# Patient Record
Sex: MALE | Race: WHITE | NOT HISPANIC OR LATINO | Employment: OTHER | URBAN - METROPOLITAN AREA
[De-identification: names, ages, dates, MRNs, and addresses within clinical notes are randomized per-mention and may not be internally consistent; named-entity substitution may affect disease eponyms.]

---

## 2017-02-15 ENCOUNTER — OFFICE VISIT (OUTPATIENT)
Dept: FAMILY MEDICINE | Facility: CLINIC | Age: 47
End: 2017-02-15
Payer: COMMERCIAL

## 2017-02-15 VITALS
WEIGHT: 175.38 LBS | SYSTOLIC BLOOD PRESSURE: 138 MMHG | HEIGHT: 69 IN | DIASTOLIC BLOOD PRESSURE: 92 MMHG | BODY MASS INDEX: 25.98 KG/M2 | TEMPERATURE: 98 F | HEART RATE: 79 BPM | RESPIRATION RATE: 20 BRPM

## 2017-02-15 DIAGNOSIS — K21.00 GASTROESOPHAGEAL REFLUX DISEASE WITH ESOPHAGITIS: Primary | ICD-10-CM

## 2017-02-15 DIAGNOSIS — I10 ESSENTIAL HYPERTENSION: ICD-10-CM

## 2017-02-15 PROCEDURE — 99999 PR PBB SHADOW E&M-EST. PATIENT-LVL III: CPT | Mod: PBBFAC,,, | Performed by: FAMILY MEDICINE

## 2017-02-15 PROCEDURE — 3080F DIAST BP >= 90 MM HG: CPT | Mod: S$GLB,,, | Performed by: FAMILY MEDICINE

## 2017-02-15 PROCEDURE — 90715 TDAP VACCINE 7 YRS/> IM: CPT | Mod: S$GLB,,, | Performed by: FAMILY MEDICINE

## 2017-02-15 PROCEDURE — 99213 OFFICE O/P EST LOW 20 MIN: CPT | Mod: 25,S$GLB,, | Performed by: FAMILY MEDICINE

## 2017-02-15 PROCEDURE — 90471 IMMUNIZATION ADMIN: CPT | Mod: S$GLB,,, | Performed by: FAMILY MEDICINE

## 2017-02-15 PROCEDURE — 3075F SYST BP GE 130 - 139MM HG: CPT | Mod: S$GLB,,, | Performed by: FAMILY MEDICINE

## 2017-02-15 RX ORDER — DEXLANSOPRAZOLE 60 MG/1
60 CAPSULE, DELAYED RELEASE ORAL DAILY
Qty: 30 CAPSULE | Refills: 11 | Status: SHIPPED | OUTPATIENT
Start: 2017-02-15 | End: 2017-04-03

## 2017-02-15 RX ORDER — BENAZEPRIL HYDROCHLORIDE 10 MG/1
10 TABLET ORAL DAILY
Qty: 30 TABLET | Refills: 11 | Status: SHIPPED | OUTPATIENT
Start: 2017-02-15 | End: 2017-09-11 | Stop reason: SDUPTHER

## 2017-02-15 NOTE — MR AVS SNAPSHOT
[facility]  [address] [facility] [address]  Phone: [phone]  Fax: [phone]                  Nj Ovalle Jr.   2/15/2017 11:40 AM   Office Visit    Description:  Male : 1970   Provider:  Gomez Ta MD   Department:  [facility]           Reason for Visit     Other Misc           Diagnoses this Visit        Comments    Gastroesophageal reflux disease with esophagitis    -  Primary     Essential hypertension                To Do List           Goals (5 Years of Data)     None      Follow-Up and Disposition     Return in about 3 weeks (around 3/8/2017) for blood pressure check.       These Medications        Disp Refills Start End    dexlansoprazole (DEXILANT) 60 mg capsule 30 capsule 11 2/15/2017 2/15/2018    Take 1 capsule (60 mg total) by mouth once daily. - Oral    Pharmacy: [facility] - [place] [id number] [facility] Ph #: [phone]       benazepril (LOTENSIN) 10 MG tablet 30 tablet 11 2/15/2017     Take 1 tablet (10 mg total) by mouth once daily. - Oral    Pharmacy: [facility] - [place] [id number] [facility] Ph #: [phone]         [facility]     [facility] -  Assistance  Registered nurses in the [facility] provide clinical advisement, health education, appointment booking, and other advisory services.  Call for this free service at [phone].             Medications           Message regarding Medications     Verify the changes and/or additions to your medication regime listed below are the same as discussed with your clinician today.  If any of these changes or additions are incorrect, please notify your healthcare provider.        START taking these NEW medications        Refills    dexlansoprazole (DEXILANT) 60 mg capsule 11    Sig: Take 1 capsule (60 mg total) by mouth once daily.    Class: Normal    Route: Oral    benazepril (LOTENSIN) 10 MG tablet  "11    Sig: Take 1 tablet (10 mg total) by mouth once daily.    Class: Normal    Route: Oral      STOP taking these medications     esomeprazole (NEXIUM) 40 MG capsule Take 1 capsule (40 mg total) by mouth before breakfast.           Verify that the below list of medications is an accurate representation of the medications you are currently taking.  If none reported, the list may be blank. If incorrect, please contact your healthcare provider. Carry this list with you in case of emergency.           Current Medications     escitalopram oxalate (LEXAPRO) 20 MG tablet Take 1 tablet (20 mg total) by mouth once daily.    benazepril (LOTENSIN) 10 MG tablet Take 1 tablet (10 mg total) by mouth once daily.    dexlansoprazole (DEXILANT) 60 mg capsule Take 1 capsule (60 mg total) by mouth once daily.           Clinical Reference Information           Your Vitals Were     BP Pulse Temp Resp Height Weight    138/92 79 98.2 °F (36.8 °C) (Oral) 20 5' 9" (1.753 m) 79.5 kg (175 lb 6 oz)    BMI                25.9 kg/m2          Blood Pressure          Most Recent Value    BP  (!)  138/92      Allergies as of 2/15/2017     Bupropion Hcl    Benadryl [Diphenhydramine Hcl]      Immunizations Administered on Date of Encounter - 2/15/2017     Name Date Dose VIS Date Route    TDAP  Incomplete 0.5 mL 2/24/2015 Intramuscular      Orders Placed During Today's Visit      Normal Orders This Visit    Tdap Vaccine       Instructions    Controlling High Blood Pressure  High blood pressure (hypertension) is called the silent killer. This is because many people who have it dont know it. Normal blood pressure is less than 120/80. Know your blood pressure and remember to check it regularly. Doing so can save your life. Here are some things you can do to help control your blood pressure.    Choose heart-healthy foods  · Select low-salt, low-fat foods.  · Limit canned, dried, cured, packaged, and fast foods. These can contain a lot of salt.  · Eat " 8-10 servings of  fruits and vegetables every day.  · Choose lean meats, fish, or chicken.  · Eat whole-grain pasta, brown rice, and beans.  · Eat 2-3 servings of low-fat or fat-free dairy products  · Ask your doctor about the DASH eating plan. This plan helps reduce blood pressure.  Maintain a healthy weight  · Ask your healthcare provider how many calories to eat a day. Then stick to that number.  · Ask your healthcare provider what weight range is healthiest for you. If you are overweight, weight loss of only 10 lbs can help lower blood pressure.  · Limit snacks and sweets.  · Get regular exercise.    Get up and get active  · Choose activities you enjoy. Find ones you can do with friends or family.  · Park farther away from building entrances.  · Use stairs instead of the elevator.  · When you can, walk or bike instead of driving.  · [place] leaves, garden, or do household repairs.  · Be active for at least 30 minutes a day, most days of the week.  Manage stress  · Make time to relax and enjoy life. Find time to laugh.  · Visit with family and friends, and keep up with hobbies.  Limit alcohol and quit smoking  · Men: Have no more than 2 drinks per day.  · Women: Have no more than 1 drink per day.  · Talk with your healthcare provider about quitting smoking. Smoking increases your risk for heart disease and stroke. Ask about local or community programs that can help.  Medications   If lifestyle changes arent enough, your healthcare provider may prescribe high blood pressure medicine. Take all medications as prescribed.    © 3053-4160 [facility], [address] [place] [address], [place] [address]. All rights reserved. This information is not intended as a substitute for professional medical care. Always follow your healthcare professional's instructions.  Discharge Instructions for High Blood Pressure (Hypertension)  You have been diagnosed with hypertension. Also called high blood pressure, this means the force of blood  against your artery walls is too strong. It also means your heart is working hard to move blood. High blood pressure produces no symptoms, but over time it can damage your heart, blood vessels, eyes, kidneys, and other organs. With help from your doctor, you can manage your blood pressure and protect your health.  Taking Medications  MARGIN-BOTTOM: 0mm   Learn to take your own blood pressure. Keep a record of your results. Ask your doctor which readings mean that you need medical attention.  Take your blood pressure medication exactly as directed. Dont skip doses. Missing doses can cause your blood pressure to get out of control.  Avoid medications that contain heart stimulants, including over-the-counter drugs. Check for warnings about hypertension on the label.  Check with your doctor before taking a decongestant. Some decongestants can worsen hypertension.  Lifestyle Changes  MARGIN-BOTTOM: 0mm   Maintain a healthy weight. Get help to lose any extra pounds.  Cut back on salt.   · Limit canned, dried, packaged, and fast foods.   · Dont add salt to your food at the table.   · Season foods with herbs instead of salt when you cook.   Begin an exercise program. Ask your doctor how to get started. You can benefit from simple activities like walking or gardening.  Break the smoking habit. Enroll in a stop-smoking program to improve your chances of success. Ask your healthcare provider about programs and medications to help you stop smoking.  Limit drinks that contain caffeine (coffee, black or green tea, cola) to 2 per day.  Never take stimulants such as amphetamines or cocaine; these drugs can be deadly for someone with hypertension.  Control your stress. Learn stress-management techniques.  Limit alcohol to no more than 2 drinks a day.  Follow-Up  Make a follow-up appointment as directed by our staff.  When to Call Your Doctor   Call your doctor immediately if you have any of the following:  MARGIN-BOTTOM: 0mm    Chest pain or shortness of breath (call 911)  Moderate to severe headache  Weakness in the muscles of your face, arms, or legs  Trouble speaking  Extreme drowsiness  Confusion  Fainting or dizziness  Pulsating or rushing sound in your ears  Unexplained nosebleed  Weakness, tingling, or numbness of your face, arms, or legs  Change in vision    © 2000-2011 [employer], [address] [place] [address], [place] [address]. All rights reserved. This information is not intended as a substitute for professional medical care. Always follow your healthcare professional's instructions.  Discharge Instructions: Taking Your Blood Pressure  Blood pressure is the force of blood as it moves from the heart through the blood vessels. You can take your own blood pressure reading using a digital monitor. Take readings as often as your doctor instructs. Take your readings in the same way, using the same arm. Here are guidelines for taking your blood pressure.   1. Relax   · Wait at least a half-hour after smoking, eating, or exercising.  · Sit comfortably at a table. Place the monitor near you.  · Rest for a few minutes before you begin.      2. Wrap the Cuff   · Place your arm on the table, palm up. Put your arm in a position that is level with your heart. Wrap the cuff around your upper arm, just above your elbow. It's best to wrap the cuff on bare skin, not over clothing.  · Make sure your cuff fits. If it doesn't wrap around your upper arm, order a larger cuff.      3. Inflate the Cuff   · Pump the cuff until the scale reads 160. If you have a self-inflating cuff, push the button that starts the pump.  · The cuff will tighten, then loosen.  · The numbers will change. When they stop changing, your blood pressure reading will appear.  · If you get a reading that is too high or too low for you, relax for a few minutes. Then do the test again.      4. Write Down the Results   · Write down your blood pressure numbers. Aba the date and  "time. Keep your results in one place, such as a notebook.  · Remove the cuff from your arm. Turn off the machine.         © 1132-9937 [employer], [address] [place] [address], [place] [address]. All rights reserved. This information is not intended as a substitute for professional medical care. Always follow your healthcare professional's instructions.  HIGH BLOOD PRESSURE --ESTABLISHED    High Blood Pressure (Hypertension) is a chronic disease. The cause is unknown in most cases. It can usually be controlled with lifestyle changes and/or medicines. Symptoms of high blood pressure may include headache, dizziness, visual changes, chest pain and shortness of breath. Sometimes it causes no symptoms at all. However, even if there are no symptoms, untreated high blood pressure increases the risk of heart attack and stroke. It is a serious health risk and should not be ignored.  A normal blood pressure is 120/80 or less. The first (top) number is the "systolic" pressure. The second (bottom) number is the "diastolic" pressure. Hypertension exists when either the top number is 140 or higher, OR the bottom number is 90 or higher on repeated measurements.  HOME CARE:  All patients with high blood pressure should do the following to lower their pressure. If you are on medicines, then these methods may reduce or eliminate your need for medicines in the future.  MARGIN-BOTTOM: 0mm   Begin a weight loss program if you are overweight.  Reduce your salt intake.  · Avoid high salt foods (olives, pickles, smoked meats, salted potato chips, etc.).   · Do not add salt to your food at the table.   · Use only small amounts of salt when cooking.   MARGIN-BOTTOM: 0mm   Begin an exercise program. Discuss with your doctor what type of exercise program would be best for you. It doesn't have to be difficult. Even brisk walking for 20 minutes three times a week is a good form of exercise.  Avoid medicines which contain heart stimulants. This " includes many cold and sinus decongestant pills and sprays as well as diet pills. Check the warnings about hypertension on the label. Stimulants such as amphetamine or cocaine could be lethal for someone with hypertension. Never take these.  Limit your caffeine intake or switch to caffeine-free products.  Stop smoking. If you are a long-time smoker, this can be hard. Enroll in a stop-smoking program to improve your chance of success.  Learning how to handle stress better is an important part of any program to lower blood pressure. Learn about relaxation methods such as meditation, yoga or biofeedback.  If medicines were prescribed, take them exactly as directed. Missing doses may cause your blood pressure get out of control.  Consider buying an automatic blood pressure machine (available at most pharmacies). Use this to monitor your blood pressure at home and report the results to your doctor.  FOLLOW UP: Regular visits to your own physician for blood pressure checks and medicine adjustment is an important part of your care. Make a follow-up appointment as directed by our staff.  GET PROMPT MEDICAL ATTENTION if any of the following occur:  MARGIN-BOTTOM: 0mm   Chest pain or shortness of breath  Severe headache  Throbbing or rushing sound in the ears  Nosebleed  Sudden severe abdominal pain  Extreme drowsiness, confusion or fainting  Dizziness or vertigo (dizziness with spinning sensation)  Weakness of an arm or leg or one side of the face  Difficulty with speech or vision  © 6555-9779 [facility], [address] [place] [address], [place] [address]. All rights reserved. This information is not intended as a substitute for professional medical care. Always follow your healthcare professional's instructions.  HIGH BLOOD PRESSURE -- NEW (begin tx)  Your blood pressure was high enough today to start treatment with medicines. The cause of hypertension is unknown in most cases, but can be controlled with lifestyle changes  "and/or medicines. Hypertension may cause headache, dizziness, blurred vision, rushing sound in your ears, chest pain or shortness of breath. Sometimes it causes no symptoms at all. However, untreated hypertension increases the risk of heart attack and stroke. It is a serious health risk and should not be ignored.    A normal blood pressure is 120/80 or less. The first (top) number is the "systolic" pressure. The second (bottom) number is the "diastolic" pressure. Hypertension exists when either the top number is 140 or higher, OR the bottom number is 90 or higher on repeated measurements.  HOME CARE:  All patients with hypertension should do the following to lower their pressure. If you are on medicines, then these methods may reduce or eliminate your need for medicine in the future.  MARGIN-BOTTOM: 0mm   Begin a weight loss program if you are overweight.  Reduce your salt intake.  · Avoid high salt foods (olives, pickles, smoked meats, salted potato chips, etc.).   · Do not add salt to your food at the table.   · Use only small amounts of salt when cooking.   MARGIN-BOTTOM: 0mm   Begin an exercise program. Discuss with your doctor what type of exercise program would be best for you. It doesn't have to be difficult. Even brisk walking for 20 minutes three times a week is a good form of exercise.  Avoid medicines which contain heart stimulants. This includes many cold and sinus decongestant pills and sprays as well as diet pills. Check the warnings about hypertension on the label. Stimulants such as amphetamine or cocaine could be lethal for someone with hypertension. Never take these.  Limit your caffeine intake or switch to caffeine-free products.  Stop smoking. If you are a long-time smoker, this can be hard. Enroll in a stop-smoking program to improve your chance of success. Talk to your physician about ways to improve your chance of success.  Learning how to handle stress better is an important part of any " program to lower blood pressure. Learn about relaxation methods such as meditation, yoga, or biofeedback.  If medicines were prescribed, take them exactly as directed. Missing doses may cause your blood pressure to get out of control.  Consider buying an automatic blood pressure machine (available at many pharmacies). Use this to monitor your blood pressure and report to your doctor.  FOLLOW UP: Because a new blood pressure medicine was started today, it is important that you have your blood pressure rechecked to be sure you are responding well and that there are no serious side effects. Unless told otherwise, follow-up with your doctor or this facility within the next THREE DAYS.  GET PROMPT MEDICAL ATTENTION if any of the following occur:  MARGIN-BOTTOM: 0mm   Chest pain or shortness of breath  Severe headache  Throbbing or rushing sound in the ears  Nosebleed  Sudden severe abdominal pain  Extreme drowsiness, confusion or fainting  Dizziness or vertigo (dizziness with spinning sensation)  Weakness of an arm or leg or one side of the face  Difficulty with speech or vision  © 7152-3657 [place] [address]. All rights reserved. This information is not intended as a substitute for professional medical care. Always follow your healthcare professional's instructions.  Tips for Using Less Salt  Most people with heart problems need to eat less salt (sodium). Reducing the amount of salt you eat may help control your blood pressure. The higher your blood pressure, the greater your risk for heart disease, stroke, blindness, and kidney problems.    At the Store  · Make low-salt choices by reading labels carefully. Look for the total amount of sodium per serving.  · Use more fresh food. Buy more fruits and vegetables. Select lean meats, fish, and poultry.  · Use less frozen, canned, and packaged foods. These often contain a lot of sodium.    In the Kitchen  · Dont add salt to food when  youre cooking. Season with flavorings such as onion, garlic, pepper, and lemon.  · Use a cookbook containing low-salt recipes. It can give you ideas for tasty meals that are healthy for your heart.  · Sprinkle salt-free herbal blends on vegetables and meat.    Eating Out  · Tell the  youre on a low-salt diet. Ask questions about the menu.  · Order fish, chicken, and meat broiled, baked, poached, or grilled without salt, butter, or breading.  · Use lemon, pepper, and salt-free herb mixes to add flavor.  · Choose plain steamed rice, boiled noodles, and baked or boiled potatoes. Top potatoes with chives and a little sour cream.  Beware! Salt goes by many other names. Limit foods with these words listed as ingredients: salt, sodium, soy sauce, baking soda, baking powder, MSG, monosodium, Na (the chemical symbol for sodium). Some antacids are also high in salt.    © 4987-5337 [facility], [address] [place] [address]. All rights reserved. This information is not intended as a substitute for professional medical care. Always follow your healthcare professional's instructions.  High Blood Pressure:  Low-Sodium Diet   Low-sodium diets are often prescribed to help control high blood pressure.  The most common source of sodium in the diet is table salt added to foods.  A low-sodium diet limits the amount of sodium in your diet to no more than 2 grams (2000 milligrams) a day.  This is about half the amount of sodium in the average diet.    Dietary Recommendations   You can reduce the amount of sodium in your diet by following these guidelines:    Read labels carefully.  Look for any form of sodium or salt, such as sodium benzoate or sodium citrate.     Add very little or no salt to food that you prepare.     Check the sodium content when you use baking powder, baking soda, and monosodium glutamate (MSG).     Do not add salt to food at the table.     Fast foods are very high in salt, as are many  other restaurant foods.  When you eat at a restaurant, try steamed fish and vegetables or fresh salads.  Avoid soups.     Avoid eating the following foods:    ketchup, prepared mustard, pickles, and olives    soy sauce, steak or barbecue sauce, chili sauce, or Worcestershire sauce    bouillon cubes    commercially prepared or cured meats or fish (for example, dean, luncheon meats, and canned sardines)    canned vegetables, soups, and other packaged convenience foods    salty cheeses and buttermilk    salted nuts and peanut butter    self-rising flour and biscuit mixes    salted crackers, chips, popcorn, and pretzels    commercial salad dressings    instant cooked cereals.    Many of these foods are now available in unsalted or low-sodium versions.  Read all labels carefully.    If your diet must be restricted to much lower amounts of sodium, talk to your doctor and a registered dietitian for help in planning your meals.  It is important to keep your meals nutritionally balanced and tasty.  It can be hard to follow a restricted-salt diet if the food doesn't taste good.    Use of Salt Substitutes   Ask your doctor if you should use salt substitutes.  Most salt substitutes contain potassium for flavor and potassium may not be good for you.    Substitutions and Hints    Cook with imagination and make food attractive.     Season foods with herbs and spices.  Use onions, garlic, parsley, lemon and lime juice and rind, dill weed, basil, tarragon, marjoram, thyme, dawn powder, turmeric, cumin, paprika, vinegar, and wine to enhance the flavor and aroma of foods.  Mushrooms, celery, red pepper, yellow pepper, green pepper, and home-dried fruits also enhance specific dishes.     Eat fresh fruits, vegetables, and meats as much as possible.  Plain frozen fruits and vegetables usually do not have added salt.     Add a pinch of sugar or a squeeze of lemon juice to bring out the flavor in fresh vegetables.     If  you must use canned products, use the low-sodium types (except for fruit).  Rinse canned vegetables with tap water before cooking.     Substitute unsalted, polyunsaturated margarine for regular margarine or butter.     Eat low-sodium cheeses.  Many are available now, some with herbs and spices that are very tasty, and many are also low-fat.     Drink low-sodium juices.     Make unsalted or lightly salted soup stocks and keep them in the freezer to use as substitutes for canned broth and bouillon.  Use these broths to enhance vegetables.     Substitute amy and vinegars (especially the flavored vinegars) for salt to enhance flavors.     Eat tuna and salmon packed in water instead of oil, and rinse first with running water.     Use one or more of the following to season chicken: dawn, turmeric, cumin, cilantro, tarragon, thyme, [place], onions, garlic, mushrooms, tomatoes, or orange, lemon, or lime juice with jessy.     Use one or more of the following to season beef:  dry mustard, marjoram, thyme, bay leaf, pepper, red wine, mushrooms, onions, red or green pepper, parsley, dawn, green chilies, or orange rind.     Use one or more of the following to season seafood: lemon, parsley, paprika, wine, garlic and onions, cilantro, jessy, bay leaf, fennel, dill, marjoram, or thyme.     Use one or more of the following to season pasta:  basil, oregano, fresh tomatoes, onions, garlic, green pepper, red pepper, yellow pepper, low-salt salad dressings, pine nuts, or low-salt mozzarella cheese.     Cook rice in homemade broth with mushrooms and scallions or shallots.     Use herbs and spices carefully at first.  Whenever possible, use fresh or dried herbs.    Help Yourself Become Healthier    Try some of the many frozen prepared meals that meet the American Heart Association guidelines for sodium and fat content.     Read food labels for sodium and fat content.     Read nutrition information available at your  local library, from the American Heart Association, and through nutrition programs and health fairs.     Contact a dietitian for information.     Look for some of the excellent low-sodium cookbooks available in most bookstores.     Take time to plan your meals.  You will be pleasantly surprised at how fast you learn new food preparations, how lowering your sodium intake lowers your blood pressure, and how good food can be.    Developed by Millie Chance R.N., M.N., and [employer]         Smoking Cessation     If you would like to quit smoking:   You may be eligible for free services if you are a [place] resident and started smoking cigarettes before September 1, 1988.  Call the Smoking Cessation [employer] ([facility]) toll free at [phone] or [phone].   Call -800-QUIT-NOW if you do not meet the above criteria.            Language Assistance Services     ATTENTION: Language assistance services are available, free of charge. Please call [phone].      ATENCIÓN: Si habla español, tiene a hirsch disposición servicios gratuitos de asistencia lingüística. Llame al [phone].     CHÚ Ý: N?u b?n nói Ti?ng Vi?t, có các d?ch v? h? tr? ngôn ng? mi?n phí dành cho b?n. G?i s? [phone].         [facility] complies with applicable Federal civil rights laws and does not discriminate on the basis of race, color, national origin, age, disability, or sex.

## 2017-02-15 NOTE — PATIENT INSTRUCTIONS
Controlling High Blood Pressure  High blood pressure (hypertension) is called the silent killer. This is because many people who have it dont know it. Normal blood pressure is less than 120/80. Know your blood pressure and remember to check it regularly. Doing so can save your life. Here are some things you can do to help control your blood pressure.    Choose heart-healthy foods  · Select low-salt, low-fat foods.  · Limit canned, dried, cured, packaged, and fast foods. These can contain a lot of salt.  · Eat 8-10 servings of  fruits and vegetables every day.  · Choose lean meats, fish, or chicken.  · Eat whole-grain pasta, brown rice, and beans.  · Eat 2-3 servings of low-fat or fat-free dairy products  · Ask your doctor about the DASH eating plan. This plan helps reduce blood pressure.  Maintain a healthy weight  · Ask your healthcare provider how many calories to eat a day. Then stick to that number.  · Ask your healthcare provider what weight range is healthiest for you. If you are overweight, weight loss of only 10 lbs can help lower blood pressure.  · Limit snacks and sweets.  · Get regular exercise.    Get up and get active  · Choose activities you enjoy. Find ones you can do with friends or family.  · Park farther away from building entrances.  · Use stairs instead of the elevator.  · When you can, walk or bike instead of driving.  · [place] leaves, garden, or do household repairs.  · Be active for at least 30 minutes a day, most days of the week.  Manage stress  · Make time to relax and enjoy life. Find time to laugh.  · Visit with family and friends, and keep up with hobbies.  Limit alcohol and quit smoking  · Men: Have no more than 2 drinks per day.  · Women: Have no more than 1 drink per day.  · Talk with your healthcare provider about quitting smoking. Smoking increases your risk for heart disease and stroke. Ask about local or community programs that can help.  Medications   If lifestyle changes arent  enough, your healthcare provider may prescribe high blood pressure medicine. Take all medications as prescribed.    © 8288-3570 [employer], [address] [facility], [place] [address]. All rights reserved. This information is not intended as a substitute for professional medical care. Always follow your healthcare professional's instructions.  Discharge Instructions for High Blood Pressure (Hypertension)  You have been diagnosed with hypertension. Also called high blood pressure, this means the force of blood against your artery walls is too strong. It also means your heart is working hard to move blood. High blood pressure produces no symptoms, but over time it can damage your heart, blood vessels, eyes, kidneys, and other organs. With help from your doctor, you can manage your blood pressure and protect your health.  Taking Medications  MARGIN-BOTTOM: 0mm   Learn to take your own blood pressure. Keep a record of your results. Ask your doctor which readings mean that you need medical attention.  Take your blood pressure medication exactly as directed. Dont skip doses. Missing doses can cause your blood pressure to get out of control.  Avoid medications that contain heart stimulants, including over-the-counter drugs. Check for warnings about hypertension on the label.  Check with your doctor before taking a decongestant. Some decongestants can worsen hypertension.  Lifestyle Changes  MARGIN-BOTTOM: 0mm   Maintain a healthy weight. Get help to lose any extra pounds.  Cut back on salt.   · Limit canned, dried, packaged, and fast foods.   · Dont add salt to your food at the table.   · Season foods with herbs instead of salt when you cook.   Begin an exercise program. Ask your doctor how to get started. You can benefit from simple activities like walking or gardening.  Break the smoking habit. Enroll in a stop-smoking program to improve your chances of success. Ask your healthcare provider about programs and  medications to help you stop smoking.  Limit drinks that contain caffeine (coffee, black or green tea, cola) to 2 per day.  Never take stimulants such as amphetamines or cocaine; these drugs can be deadly for someone with hypertension.  Control your stress. Learn stress-management techniques.  Limit alcohol to no more than 2 drinks a day.  Follow-Up  Make a follow-up appointment as directed by our staff.  When to Call Your Doctor   Call your doctor immediately if you have any of the following:  MARGIN-BOTTOM: 0mm   Chest pain or shortness of breath (call 911)  Moderate to severe headache  Weakness in the muscles of your face, arms, or legs  Trouble speaking  Extreme drowsiness  Confusion  Fainting or dizziness  Pulsating or rushing sound in your ears  Unexplained nosebleed  Weakness, tingling, or numbness of your face, arms, or legs  Change in vision    © 1518-0476 [facility], [address] [place] [address]. All rights reserved. This information is not intended as a substitute for professional medical care. Always follow your healthcare professional's instructions.  Discharge Instructions: Taking Your Blood Pressure  Blood pressure is the force of blood as it moves from the heart through the blood vessels. You can take your own blood pressure reading using a digital monitor. Take readings as often as your doctor instructs. Take your readings in the same way, using the same arm. Here are guidelines for taking your blood pressure.   1. Relax   · Wait at least a half-hour after smoking, eating, or exercising.  · Sit comfortably at a table. Place the monitor near you.  · Rest for a few minutes before you begin.      2. Wrap the Cuff   · Place your arm on the table, palm up. Put your arm in a position that is level with your heart. Wrap the cuff around your upper arm, just above your elbow. It's best to wrap the cuff on bare skin, not over clothing.  · Make sure your cuff fits. If it doesn't wrap around  "your upper arm, order a larger cuff.      3. Inflate the Cuff   · Pump the cuff until the scale reads 160. If you have a self-inflating cuff, push the button that starts the pump.  · The cuff will tighten, then loosen.  · The numbers will change. When they stop changing, your blood pressure reading will appear.  · If you get a reading that is too high or too low for you, relax for a few minutes. Then do the test again.      4. Write Down the Results   · Write down your blood pressure numbers. Aba the date and time. Keep your results in one place, such as a notebook.  · Remove the cuff from your arm. Turn off the machine.         © 3818-0470 [facility], [address] [place] [address], [place] [address]. All rights reserved. This information is not intended as a substitute for professional medical care. Always follow your healthcare professional's instructions.  HIGH BLOOD PRESSURE --ESTABLISHED    High Blood Pressure (Hypertension) is a chronic disease. The cause is unknown in most cases. It can usually be controlled with lifestyle changes and/or medicines. Symptoms of high blood pressure may include headache, dizziness, visual changes, chest pain and shortness of breath. Sometimes it causes no symptoms at all. However, even if there are no symptoms, untreated high blood pressure increases the risk of heart attack and stroke. It is a serious health risk and should not be ignored.  A normal blood pressure is 120/80 or less. The first (top) number is the "systolic" pressure. The second (bottom) number is the "diastolic" pressure. Hypertension exists when either the top number is 140 or higher, OR the bottom number is 90 or higher on repeated measurements.  HOME CARE:  All patients with high blood pressure should do the following to lower their pressure. If you are on medicines, then these methods may reduce or eliminate your need for medicines in the future.  MARGIN-BOTTOM: 0mm   Begin a weight loss program if you are " overweight.  Reduce your salt intake.  · Avoid high salt foods (olives, pickles, smoked meats, salted potato chips, etc.).   · Do not add salt to your food at the table.   · Use only small amounts of salt when cooking.   MARGIN-BOTTOM: 0mm   Begin an exercise program. Discuss with your doctor what type of exercise program would be best for you. It doesn't have to be difficult. Even brisk walking for 20 minutes three times a week is a good form of exercise.  Avoid medicines which contain heart stimulants. This includes many cold and sinus decongestant pills and sprays as well as diet pills. Check the warnings about hypertension on the label. Stimulants such as amphetamine or cocaine could be lethal for someone with hypertension. Never take these.  Limit your caffeine intake or switch to caffeine-free products.  Stop smoking. If you are a long-time smoker, this can be hard. Enroll in a stop-smoking program to improve your chance of success.  Learning how to handle stress better is an important part of any program to lower blood pressure. Learn about relaxation methods such as meditation, yoga or biofeedback.  If medicines were prescribed, take them exactly as directed. Missing doses may cause your blood pressure get out of control.  Consider buying an automatic blood pressure machine (available at most pharmacies). Use this to monitor your blood pressure at home and report the results to your doctor.  FOLLOW UP: Regular visits to your own physician for blood pressure checks and medicine adjustment is an important part of your care. Make a follow-up appointment as directed by our staff.  GET PROMPT MEDICAL ATTENTION if any of the following occur:  MARGIN-BOTTOM: 0mm   Chest pain or shortness of breath  Severe headache  Throbbing or rushing sound in the ears  Nosebleed  Sudden severe abdominal pain  Extreme drowsiness, confusion or fainting  Dizziness or vertigo (dizziness with spinning sensation)  Weakness of an arm or  "leg or one side of the face  Difficulty with speech or vision  © 8992-8859 [employer], [address] [facility], [place] [address]. All rights reserved. This information is not intended as a substitute for professional medical care. Always follow your healthcare professional's instructions.  HIGH BLOOD PRESSURE -- NEW (begin tx)  Your blood pressure was high enough today to start treatment with medicines. The cause of hypertension is unknown in most cases, but can be controlled with lifestyle changes and/or medicines. Hypertension may cause headache, dizziness, blurred vision, rushing sound in your ears, chest pain or shortness of breath. Sometimes it causes no symptoms at all. However, untreated hypertension increases the risk of heart attack and stroke. It is a serious health risk and should not be ignored.    A normal blood pressure is 120/80 or less. The first (top) number is the "systolic" pressure. The second (bottom) number is the "diastolic" pressure. Hypertension exists when either the top number is 140 or higher, OR the bottom number is 90 or higher on repeated measurements.  HOME CARE:  All patients with hypertension should do the following to lower their pressure. If you are on medicines, then these methods may reduce or eliminate your need for medicine in the future.  MARGIN-BOTTOM: 0mm   Begin a weight loss program if you are overweight.  Reduce your salt intake.  · Avoid high salt foods (olives, pickles, smoked meats, salted potato chips, etc.).   · Do not add salt to your food at the table.   · Use only small amounts of salt when cooking.   MARGIN-BOTTOM: 0mm   Begin an exercise program. Discuss with your doctor what type of exercise program would be best for you. It doesn't have to be difficult. Even brisk walking for 20 minutes three times a week is a good form of exercise.  Avoid medicines which contain heart stimulants. This includes many cold and sinus decongestant pills and sprays as well as " diet pills. Check the warnings about hypertension on the label. Stimulants such as amphetamine or cocaine could be lethal for someone with hypertension. Never take these.  Limit your caffeine intake or switch to caffeine-free products.  Stop smoking. If you are a long-time smoker, this can be hard. Enroll in a stop-smoking program to improve your chance of success. Talk to your physician about ways to improve your chance of success.  Learning how to handle stress better is an important part of any program to lower blood pressure. Learn about relaxation methods such as meditation, yoga, or biofeedback.  If medicines were prescribed, take them exactly as directed. Missing doses may cause your blood pressure to get out of control.  Consider buying an automatic blood pressure machine (available at many pharmacies). Use this to monitor your blood pressure and report to your doctor.  FOLLOW UP: Because a new blood pressure medicine was started today, it is important that you have your blood pressure rechecked to be sure you are responding well and that there are no serious side effects. Unless told otherwise, follow-up with your doctor or this facility within the next THREE DAYS.  GET PROMPT MEDICAL ATTENTION if any of the following occur:  MARGIN-BOTTOM: 0mm   Chest pain or shortness of breath  Severe headache  Throbbing or rushing sound in the ears  Nosebleed  Sudden severe abdominal pain  Extreme drowsiness, confusion or fainting  Dizziness or vertigo (dizziness with spinning sensation)  Weakness of an arm or leg or one side of the face  Difficulty with speech or vision  © 2000-2011 [facility], [address] [place] [address], [place] [address]. All rights reserved. This information is not intended as a substitute for professional medical care. Always follow your healthcare professional's instructions.  Tips for Using Less Salt  Most people with heart problems need to eat less salt (sodium). Reducing the amount of salt you  eat may help control your blood pressure. The higher your blood pressure, the greater your risk for heart disease, stroke, blindness, and kidney problems.    At the Store  · Make low-salt choices by reading labels carefully. Look for the total amount of sodium per serving.  · Use more fresh food. Buy more fruits and vegetables. Select lean meats, fish, and poultry.  · Use less frozen, canned, and packaged foods. These often contain a lot of sodium.    In the Kitchen  · Dont add salt to food when youre cooking. Season with flavorings such as onion, garlic, pepper, and lemon.  · Use a cookbook containing low-salt recipes. It can give you ideas for tasty meals that are healthy for your heart.  · Sprinkle salt-free herbal blends on vegetables and meat.    Eating Out  · Tell the  youre on a low-salt diet. Ask questions about the menu.  · Order fish, chicken, and meat broiled, baked, poached, or grilled without salt, butter, or breading.  · Use lemon, pepper, and salt-free herb mixes to add flavor.  · Choose plain steamed rice, boiled noodles, and baked or boiled potatoes. Top potatoes with chives and a little sour cream.  Beware! Salt goes by many other names. Limit foods with these words listed as ingredients: salt, sodium, soy sauce, baking soda, baking powder, MSG, monosodium, Na (the chemical symbol for sodium). Some antacids are also high in salt.    © 0910-2890 [facility], [address] [place] [address], [place] [address]. All rights reserved. This information is not intended as a substitute for professional medical care. Always follow your healthcare professional's instructions.  High Blood Pressure:  Low-Sodium Diet   Low-sodium diets are often prescribed to help control high blood pressure.  The most common source of sodium in the diet is table salt added to foods.  A low-sodium diet limits the amount of sodium in your diet to no more than 2 grams (2000 milligrams) a day.  This is about half the amount of  sodium in the average diet.    Dietary Recommendations   You can reduce the amount of sodium in your diet by following these guidelines:    Read labels carefully.  Look for any form of sodium or salt, such as sodium benzoate or sodium citrate.     Add very little or no salt to food that you prepare.     Check the sodium content when you use baking powder, baking soda, and monosodium glutamate (MSG).     Do not add salt to food at the table.     Fast foods are very high in salt, as are many other restaurant foods.  When you eat at a restaurant, try steamed fish and vegetables or fresh salads.  Avoid soups.     Avoid eating the following foods:    ketchup, prepared mustard, pickles, and olives    soy sauce, steak or barbecue sauce, chili sauce, or Worcestershire sauce    bouillon cubes    commercially prepared or cured meats or fish (for example, dean, luncheon meats, and canned sardines)    canned vegetables, soups, and other packaged convenience foods    salty cheeses and buttermilk    salted nuts and peanut butter    self-rising flour and biscuit mixes    salted crackers, chips, popcorn, and pretzels    commercial salad dressings    instant cooked cereals.    Many of these foods are now available in unsalted or low-sodium versions.  Read all labels carefully.    If your diet must be restricted to much lower amounts of sodium, talk to your doctor and a registered dietitian for help in planning your meals.  It is important to keep your meals nutritionally balanced and tasty.  It can be hard to follow a restricted-salt diet if the food doesn't taste good.    Use of Salt Substitutes   Ask your doctor if you should use salt substitutes.  Most salt substitutes contain potassium for flavor and potassium may not be good for you.    Substitutions and Hints    Cook with imagination and make food attractive.     Season foods with herbs and spices.  Use onions, garlic, parsley, lemon and lime juice and  rind, dill weed, basil, tarragon, marjoram, thyme, dawn powder, turmeric, cumin, paprika, vinegar, and wine to enhance the flavor and aroma of foods.  Mushrooms, celery, red pepper, yellow pepper, green pepper, and home-dried fruits also enhance specific dishes.     Eat fresh fruits, vegetables, and meats as much as possible.  Plain frozen fruits and vegetables usually do not have added salt.     Add a pinch of sugar or a squeeze of lemon juice to bring out the flavor in fresh vegetables.     If you must use canned products, use the low-sodium types (except for fruit).  Rinse canned vegetables with tap water before cooking.     Substitute unsalted, polyunsaturated margarine for regular margarine or butter.     Eat low-sodium cheeses.  Many are available now, some with herbs and spices that are very tasty, and many are also low-fat.     Drink low-sodium juices.     Make unsalted or lightly salted soup stocks and keep them in the freezer to use as substitutes for canned broth and bouillon.  Use these broths to enhance vegetables.     Substitute amy and vinegars (especially the flavored vinegars) for salt to enhance flavors.     Eat tuna and salmon packed in water instead of oil, and rinse first with running water.     Use one or more of the following to season chicken: dawn, turmeric, cumin, cilantro, tarragon, thyme, [place], onions, garlic, mushrooms, tomatoes, or orange, lemon, or lime juice with jessy.     Use one or more of the following to season beef:  dry mustard, marjoram, thyme, bay leaf, pepper, red wine, mushrooms, onions, red or green pepper, parsley, dawn, green chilies, or orange rind.     Use one or more of the following to season seafood: lemon, parsley, paprika, wine, garlic and onions, cilantro, jessy, bay leaf, fennel, dill, marjoram, or thyme.     Use one or more of the following to season pasta:  basil, oregano, fresh tomatoes, onions, garlic, green pepper, red pepper, yellow  pepper, low-salt salad dressings, pine nuts, or low-salt mozzarella cheese.     Cook rice in homemade broth with mushrooms and scallions or shallots.     Use herbs and spices carefully at first.  Whenever possible, use fresh or dried herbs.    Help Yourself Become Healthier    Try some of the many frozen prepared meals that meet the American Heart Association guidelines for sodium and fat content.     Read food labels for sodium and fat content.     Read nutrition information available at your local library, from the American Heart Association, and through nutrition programs and health fairs.     Contact a dietitian for information.     Look for some of the excellent low-sodium cookbooks available in most bookstores.     Take time to plan your meals.  You will be pleasantly surprised at how fast you learn new food preparations, how lowering your sodium intake lowers your blood pressure, and how good food can be.    Developed by Millie Chance R.N., M.N., and [employer]

## 2017-02-15 NOTE — PROGRESS NOTES
"Subjective:      Patient ID: Nj Ovalle Jr. is a 46 y.o. male.    Chief Complaint: Other Misc (anxiety)    HPI   He has a significant history of GERD and he has been controlled for years on prescription nexium.  He has tried to double the dose of the OTC med (since the insurance would not cover it) and he still has gerd symptoms.  In the AM, if he is not treated with the nexium, he notes that he is bloated in his abdomen and he has a gag reflex.    Getting up and moving around helps with this.   He had an EGD with DR. Leonard in 2006 and he had gastritis and esophagitis.   In 2011, I did an EGD and he had an esopahgeal ulcer and gastritis and H. Pylori which we treated.  He had an irregular  Z line but did not have metaplasia or dysplasia.    He has used prilosec and this did not work for him.  He has also had prevacid in the past and this did not help.  He has had colon polyps and is cristóbal due for a check now.      The patient presents with essential hypertension.  The patient is tolerating the medication well and is in excellent compliance.  The patient is experiencing no side effects.  Counseling was offered regarding low salt diets.  The patient has a reduced salt intake.  The patient denies chest pain, palpitations, shortness of breath, dyspnea on exertion, left or murmur neck pain, nausea, vomiting, diaphoresis, paroxysmal nocturnal dyspnea, and orthopnea.     Visit Vitals    BP (!) 138/92    Pulse 79    Temp 98.2 °F (36.8 °C) (Oral)    Resp 20    Ht 5' 9" (1.753 m)    Wt 79.5 kg (175 lb 6 oz)    BMI 25.9 kg/m2       Health Maintenance Due   Topic Date Due    TETANUS VACCINE  11/29/1986       Past Medical History:  Past Medical History   Diagnosis Date    Anxiety     Essential hypertension 9/7/2016    GERD (gastroesophageal reflux disease)     HTN (hypertension)      Past Surgical History   Procedure Laterality Date    Vasectomy       Review of patient's allergies indicates:   Allergen " "Reactions    Bupropion hcl Other (See Comments)    Benadryl [diphenhydramine hcl] Anxiety     Causes to shake     Current Outpatient Prescriptions on File Prior to Visit   Medication Sig Dispense Refill    escitalopram oxalate (LEXAPRO) 20 MG tablet Take 1 tablet (20 mg total) by mouth once daily. 90 tablet 3    [DISCONTINUED] benazepril (LOTENSIN) 5 MG tablet Take 1 tablet (5 mg total) by mouth once daily. 90 tablet 3    [DISCONTINUED] esomeprazole (NEXIUM) 40 MG capsule Take 1 capsule (40 mg total) by mouth before breakfast. 30 capsule 11     No current facility-administered medications on file prior to visit.      Social History     Social History    Marital status:      Spouse name: N/A    Number of children: N/A    Years of education: N/A     Occupational History    Not on file.     Social History Main Topics    Smoking status: Current Some Day Smoker     Packs/day: 0.25    Smokeless tobacco: Never Used    Alcohol use 3.5 oz/week     7 Standard drinks or equivalent per week    Drug use: No    Sexual activity: Yes     Partners: Female     Other Topics Concern    Not on file     Social History Narrative     Family History   Problem Relation Age of Onset    Hypertension Mother     Cancer Paternal Uncle      thyroid cancer    Cancer Paternal Grandmother            Review of Systems   Constitutional: Negative for chills and fever.   Respiratory: Negative for shortness of breath and wheezing.    Cardiovascular: Negative for chest pain and palpitations.   Gastrointestinal: Positive for abdominal pain. Negative for constipation, diarrhea, nausea and vomiting.   Genitourinary: Negative for dysuria and hematuria.       Objective:     Visit Vitals    BP (!) 138/92    Pulse 79    Temp 98.2 °F (36.8 °C) (Oral)    Resp 20    Ht 5' 9" (1.753 m)    Wt 79.5 kg (175 lb 6 oz)    BMI 25.9 kg/m2       Physical Exam   Constitutional: He appears well-developed and well-nourished.   Cardiovascular: " Normal rate, regular rhythm and normal heart sounds.  Exam reveals no gallop and no friction rub.    No murmur heard.  Pulmonary/Chest: Effort normal and breath sounds normal. No respiratory distress. He has no wheezes. He has no rales.   Musculoskeletal: He exhibits no edema.       Assessment:     1. Gastroesophageal reflux disease with esophagitis    2. Essential hypertension        Plan:   Nj was seen today for other misc.    Diagnoses and all orders for this visit:    Gastroesophageal reflux disease with esophagitis  -     dexlansoprazole (DEXILANT) 60 mg capsule; Take 1 capsule (60 mg total) by mouth once daily.    Essential hypertension    Other orders  -     Tdap Vaccine  -     benazepril (LOTENSIN) 10 MG tablet; Take 1 tablet (10 mg total) by mouth once daily.      I will change him to dexilant and if this is not covered, we will need to appeal for a PA.  He needs coverage for this.  Increase the benazepril and recheck the bp in 3 weeks.

## 2017-04-03 ENCOUNTER — OFFICE VISIT (OUTPATIENT)
Dept: FAMILY MEDICINE | Facility: CLINIC | Age: 47
End: 2017-04-03
Payer: COMMERCIAL

## 2017-04-03 ENCOUNTER — LAB VISIT (OUTPATIENT)
Dept: LAB | Facility: HOSPITAL | Age: 47
End: 2017-04-03
Attending: FAMILY MEDICINE
Payer: COMMERCIAL

## 2017-04-03 VITALS
SYSTOLIC BLOOD PRESSURE: 119 MMHG | HEIGHT: 69 IN | BODY MASS INDEX: 25.47 KG/M2 | DIASTOLIC BLOOD PRESSURE: 74 MMHG | WEIGHT: 171.94 LBS | HEART RATE: 73 BPM | TEMPERATURE: 98 F

## 2017-04-03 DIAGNOSIS — K92.1 MELENA: Primary | ICD-10-CM

## 2017-04-03 DIAGNOSIS — R10.13 EPIGASTRIC ABDOMINAL PAIN: ICD-10-CM

## 2017-04-03 DIAGNOSIS — K21.00 GASTROESOPHAGEAL REFLUX DISEASE WITH ESOPHAGITIS: ICD-10-CM

## 2017-04-03 DIAGNOSIS — F32.A DEPRESSION, UNSPECIFIED DEPRESSION TYPE: ICD-10-CM

## 2017-04-03 DIAGNOSIS — K92.1 MELENA: ICD-10-CM

## 2017-04-03 LAB
ALBUMIN SERPL BCP-MCNC: 4.2 G/DL
ALP SERPL-CCNC: 57 U/L
ALT SERPL W/O P-5'-P-CCNC: 21 U/L
AMYLASE SERPL-CCNC: 68 U/L
ANION GAP SERPL CALC-SCNC: 7 MMOL/L
AST SERPL-CCNC: 20 U/L
BASOPHILS # BLD AUTO: 0.04 K/UL
BASOPHILS NFR BLD: 0.6 %
BILIRUB SERPL-MCNC: 0.5 MG/DL
BUN SERPL-MCNC: 14 MG/DL
CALCIUM SERPL-MCNC: 9.5 MG/DL
CHLORIDE SERPL-SCNC: 104 MMOL/L
CO2 SERPL-SCNC: 27 MMOL/L
CREAT SERPL-MCNC: 1.1 MG/DL
DIFFERENTIAL METHOD: ABNORMAL
EOSINOPHIL # BLD AUTO: 0.2 K/UL
EOSINOPHIL NFR BLD: 2.8 %
ERYTHROCYTE [DISTWIDTH] IN BLOOD BY AUTOMATED COUNT: 13.8 %
EST. GFR  (AFRICAN AMERICAN): >60 ML/MIN/1.73 M^2
EST. GFR  (NON AFRICAN AMERICAN): >60 ML/MIN/1.73 M^2
GLUCOSE SERPL-MCNC: 102 MG/DL
HCT VFR BLD AUTO: 43 %
HGB BLD-MCNC: 13.8 G/DL
LIPASE SERPL-CCNC: 53 U/L
LYMPHOCYTES # BLD AUTO: 1.3 K/UL
LYMPHOCYTES NFR BLD: 19.5 %
MCH RBC QN AUTO: 28.9 PG
MCHC RBC AUTO-ENTMCNC: 32.1 %
MCV RBC AUTO: 90 FL
MONOCYTES # BLD AUTO: 0.4 K/UL
MONOCYTES NFR BLD: 5.5 %
NEUTROPHILS # BLD AUTO: 4.6 K/UL
NEUTROPHILS NFR BLD: 71.3 %
PLATELET # BLD AUTO: 207 K/UL
PMV BLD AUTO: 11.5 FL
POTASSIUM SERPL-SCNC: 4.7 MMOL/L
PROT SERPL-MCNC: 7.4 G/DL
RBC # BLD AUTO: 4.77 M/UL
SODIUM SERPL-SCNC: 138 MMOL/L
WBC # BLD AUTO: 6.41 K/UL

## 2017-04-03 PROCEDURE — 99213 OFFICE O/P EST LOW 20 MIN: CPT | Mod: S$GLB,,, | Performed by: FAMILY MEDICINE

## 2017-04-03 PROCEDURE — 83690 ASSAY OF LIPASE: CPT

## 2017-04-03 PROCEDURE — 1160F RVW MEDS BY RX/DR IN RCRD: CPT | Mod: S$GLB,,, | Performed by: FAMILY MEDICINE

## 2017-04-03 PROCEDURE — 99999 PR PBB SHADOW E&M-EST. PATIENT-LVL III: CPT | Mod: PBBFAC,,, | Performed by: FAMILY MEDICINE

## 2017-04-03 PROCEDURE — 85025 COMPLETE CBC W/AUTO DIFF WBC: CPT

## 2017-04-03 PROCEDURE — 36415 COLL VENOUS BLD VENIPUNCTURE: CPT | Mod: PO

## 2017-04-03 PROCEDURE — 80053 COMPREHEN METABOLIC PANEL: CPT

## 2017-04-03 PROCEDURE — 3078F DIAST BP <80 MM HG: CPT | Mod: S$GLB,,, | Performed by: FAMILY MEDICINE

## 2017-04-03 PROCEDURE — 3074F SYST BP LT 130 MM HG: CPT | Mod: S$GLB,,, | Performed by: FAMILY MEDICINE

## 2017-04-03 PROCEDURE — 82150 ASSAY OF AMYLASE: CPT

## 2017-04-03 RX ORDER — DEXLANSOPRAZOLE 60 MG/1
60 CAPSULE, DELAYED RELEASE ORAL DAILY
Qty: 30 CAPSULE | Refills: 11 | Status: SHIPPED | OUTPATIENT
Start: 2017-04-03 | End: 2017-08-10

## 2017-04-03 RX ORDER — FLUOXETINE HYDROCHLORIDE 20 MG/1
20 CAPSULE ORAL DAILY
Qty: 30 CAPSULE | Refills: 11 | Status: SHIPPED | OUTPATIENT
Start: 2017-04-03 | End: 2017-05-11

## 2017-04-03 RX ORDER — SUCRALFATE 1 G/1
1 TABLET ORAL 4 TIMES DAILY
Qty: 120 TABLET | Refills: 1 | Status: SHIPPED | OUTPATIENT
Start: 2017-04-03 | End: 2018-04-03

## 2017-04-03 NOTE — PROGRESS NOTES
"Subjective:      Patient ID: Nj Ovalle Jr. is a 46 y.o. male.    Chief Complaint: Emesis and Rectal Bleeding    HPI  He has a feeling like he constantly has burning and is full of gas.  He awakens a lot with a feeling like he is bloated.  It is in the epigastric area.  He has had emesis in the AM and some of the times in the day if he would gag, he would have emesis.  He was on the dexilant and it did not help so he went to an RX of sucralfate and he started it.  It helped him.  He has been on that for the last few days.  He has stopped the dexilant.  He has not had hematemesis or coffee grind emesis.   He has a burning in the epigastric area.  He is not on NSAID"s or aSA.  He is backing off of his caffeine.  He has quit smoking.  He drinks some ETOH.  He eats spicy foods but has been avoiding this.   He has had black stool,  He started that over the last 2 1/2 weeks. He has not had red stools.        He has a significant history of GERD and he has been controlled for years on prescription nexium. He has tried to double the dose of the OTC med (since the insurance would not cover it) and he still has gerd symptoms. In the AM, if he is not treated with the nexium, he notes that he is bloated in his abdomen and he has a gag reflex.   Getting up and moving around helps with this.   He had an EGD with DR. Leonard in 2006 and he had gastritis and esophagitis.   In 2011, I did an EGD and he had an esopahgeal ulcer and gastritis and H. Pylori which we treated. He had an irregular Z line but did not have metaplasia or dysplasia.   He has used prilosec and this did not work for him. He has also had prevacid in the past and this did not help.  He has had colon polyps and is not due for a check now.     He also complains of increased depression.  He is on lexapro but it is not helping. He had problems on effexor in the past and also welbutrin.  prozac helped him in the past.    There are no preventive care reminders to " "display for this patient.    Past Medical History:  Past Medical History:   Diagnosis Date    Anxiety     Essential hypertension 9/7/2016    GERD (gastroesophageal reflux disease)     HTN (hypertension)      Past Surgical History:   Procedure Laterality Date    VASECTOMY       Review of patient's allergies indicates:   Allergen Reactions    Bupropion hcl Other (See Comments)    Benadryl [diphenhydramine hcl] Anxiety     Causes to shake     Current Outpatient Prescriptions on File Prior to Visit   Medication Sig Dispense Refill    benazepril (LOTENSIN) 10 MG tablet Take 1 tablet (10 mg total) by mouth once daily. 30 tablet 11    [DISCONTINUED] escitalopram oxalate (LEXAPRO) 20 MG tablet Take 1 tablet (20 mg total) by mouth once daily. 90 tablet 3    [DISCONTINUED] dexlansoprazole (DEXILANT) 60 mg capsule Take 1 capsule (60 mg total) by mouth once daily. 30 capsule 11     No current facility-administered medications on file prior to visit.      Social History     Social History    Marital status:      Spouse name: N/A    Number of children: N/A    Years of education: N/A     Occupational History    Not on file.     Social History Main Topics    Smoking status: Former Smoker     Packs/day: 0.25     Quit date: 11/21/2016    Smokeless tobacco: Never Used    Alcohol use 3.5 oz/week     7 Standard drinks or equivalent per week    Drug use: No    Sexual activity: Yes     Partners: Female     Other Topics Concern    Not on file     Social History Narrative     Family History   Problem Relation Age of Onset    Hypertension Mother     Cancer Paternal Uncle      thyroid cancer    Cancer Paternal Grandmother            Review of Systems    Objective:   /74 (BP Location: Left arm, Patient Position: Sitting, BP Method: Automatic)  Pulse 73  Temp 98.2 °F (36.8 °C) (Oral)   Ht 5' 9" (1.753 m)  Wt 78 kg (171 lb 15.3 oz)  BMI 25.39 kg/m2    Physical Exam   Psychiatric: His mood appears not " anxious. His affect is not angry, not blunt, not labile and not inappropriate. His speech is not rapid and/or pressured, not delayed, not tangential and not slurred. He is not agitated, not aggressive, not slowed, not actively hallucinating and not combative. Thought content is not paranoid and not delusional. Cognition and memory are not impaired. He does not express impulsivity or inappropriate judgment. He exhibits a depressed mood. He expresses no homicidal and no suicidal ideation. He expresses no suicidal plans and no homicidal plans. He is communicative. He exhibits normal recent memory and normal remote memory. He is attentive.       Assessment:     1. Melena    2. Epigastric abdominal pain    3. Gastroesophageal reflux disease with esophagitis    4. Depression, unspecified depression type        Plan:   Nj was seen today for emesis and rectal bleeding.    Diagnoses and all orders for this visit:    Melena  -     Case request GI: ESOPHAGOGASTRODUODENOSCOPY (EGD)-schedule with me on 4/4/2017  -     dexlansoprazole (DEXILANT) 60 mg capsule; Take 1 capsule (60 mg total) by mouth once daily.  -     sucralfate (CARAFATE) 1 gram tablet; Take 1 tablet (1 g total) by mouth 4 (four) times daily.  -     CBC auto differential; Future    Epigastric abdominal pain  -     Case request GI: ESOPHAGOGASTRODUODENOSCOPY (EGD)-schedule with me on 4/4/2017  -     dexlansoprazole (DEXILANT) 60 mg capsule; Take 1 capsule (60 mg total) by mouth once daily.  -     sucralfate (CARAFATE) 1 gram tablet; Take 1 tablet (1 g total) by mouth 4 (four) times daily.  -     Amylase; Future  -     Lipase; Future  -     Comprehensive metabolic panel; Future    Gastroesophageal reflux disease with esophagitis  -     dexlansoprazole (DEXILANT) 60 mg capsule; Take 1 capsule (60 mg total) by mouth once daily.  -     sucralfate (CARAFATE) 1 gram tablet; Take 1 tablet (1 g total) by mouth 4 (four) times daily.    Depression, unspecified  depression type    Other orders  -     fluoxetine (PROZAC) 20 MG capsule; Take 1 capsule (20 mg total) by mouth once daily.

## 2017-04-03 NOTE — MR AVS SNAPSHOT
[facility]  [address] [address] [address]  Phone: [phone]  Fax: [phone]                  Nj Ovalle Jr.   4/3/2017 11:20 AM   Office Visit    Description:  Male : 1970   Provider:  Gomez Ta MD   Department:  [facility]           Reason for Visit     Emesis     Rectal Bleeding           Diagnoses this Visit        Comments    Melena    -  Primary     Epigastric abdominal pain         Gastroesophageal reflux disease with esophagitis         Depression, unspecified depression type                To Do List           Goals (5 Years of Data)     None      Follow-Up and Disposition     Follow-up and Disposition History       These Medications        Disp Refills Start End    dexlansoprazole (DEXILANT) 60 mg capsule 30 capsule 11 4/3/2017 4/3/2018    Take 1 capsule (60 mg total) by mouth once daily. - Oral    Pharmacy: [facility] [address] [place] [id number] [facility] Ph #: [phone]       sucralfate (CARAFATE) 1 gram tablet 120 tablet 1 4/3/2017 4/3/2018    Take 1 tablet (1 g total) by mouth 4 (four) times daily. - Oral    Pharmacy: [facility] [place] [address] [place] [id number] [facility] Ph #: [phone]       fluoxetine (PROZAC) 20 MG capsule 30 capsule 11 4/3/2017 4/3/2018    Take 1 capsule (20 mg total) by mouth once daily. - Oral    Pharmacy: [facility] [address] [address] [place] [id number] [facility] Ph #: [phone]         [facility]     [facility] Care Line -  Assistance  Unless otherwise directed by your provider, please contact [facility], our nurse care line that is available for  assistance.     Registered nurses in the [facility] provide: appointment scheduling, clinical advisement, health education, and other advisory services.  Call: [phone] (toll free)               Medications           Message regarding Medications      "Verify the changes and/or additions to your medication regime listed below are the same as discussed with your clinician today.  If any of these changes or additions are incorrect, please notify your healthcare provider.        START taking these NEW medications        Refills    sucralfate (CARAFATE) 1 gram tablet 1    Sig: Take 1 tablet (1 g total) by mouth 4 (four) times daily.    Class: Normal    Route: Oral    fluoxetine (PROZAC) 20 MG capsule 11    Sig: Take 1 capsule (20 mg total) by mouth once daily.    Class: Normal    Route: Oral      STOP taking these medications     escitalopram oxalate (LEXAPRO) 20 MG tablet Take 1 tablet (20 mg total) by mouth once daily.           Verify that the below list of medications is an accurate representation of the medications you are currently taking.  If none reported, the list may be blank. If incorrect, please contact your healthcare provider. Carry this list with you in case of emergency.           Current Medications     benazepril (LOTENSIN) 10 MG tablet Take 1 tablet (10 mg total) by mouth once daily.    dexlansoprazole (DEXILANT) 60 mg capsule Take 1 capsule (60 mg total) by mouth once daily.    fluoxetine (PROZAC) 20 MG capsule Take 1 capsule (20 mg total) by mouth once daily.    sucralfate (CARAFATE) 1 gram tablet Take 1 tablet (1 g total) by mouth 4 (four) times daily.           Clinical Reference Information           Your Vitals Were     BP Pulse Temp Height Weight BMI    119/74 (BP Location: Left arm, Patient Position: Sitting, BP Method: Automatic) 73 98.2 °F (36.8 °C) (Oral) 5' 9" (1.753 m) 78 kg (171 lb 15.3 oz) 25.39 kg/m2      Blood Pressure          Most Recent Value    BP  119/74      Allergies as of 4/3/2017     Bupropion Hcl    Benadryl [Diphenhydramine Hcl]      Immunizations Administered on Date of Encounter - 4/3/2017     None      Orders Placed During Today's Visit      Normal Orders This Visit    Case request GI: ESOPHAGOGASTRODUODENOSCOPY " (EGD)-schedule with me on 4/4/2017     Future Labs/Procedures Expected by Expires    Amylase  4/3/2017 6/2/2018    CBC auto differential  4/3/2017 (Approximate) 6/2/2018    Comprehensive metabolic panel  4/3/2017 (Approximate) 4/3/2018    Lipase  4/3/2017 6/2/2018      Language Assistance Services     ATTENTION: Language assistance services are available, free of charge. Please call [phone].      ATENCIÓN: Si habla español, tiene a hirsch disposición servicios gratuitos de asistencia lingüística. Llame al [phone].     CHÚ Ý: N?u b?n nói Ti?ng Vi?t, có các d?ch v? h? tr? ngôn ng? mi?n phí dành cho b?n. G?i s? [phone].         [facility] complies with applicable Federal civil rights laws and does not discriminate on the basis of race, color, national origin, age, disability, or sex.

## 2017-04-04 ENCOUNTER — ANESTHESIA EVENT (OUTPATIENT)
Dept: ENDOSCOPY | Facility: HOSPITAL | Age: 47
End: 2017-04-04
Payer: COMMERCIAL

## 2017-04-04 ENCOUNTER — HOSPITAL ENCOUNTER (OUTPATIENT)
Facility: HOSPITAL | Age: 47
Discharge: HOME | End: 2017-04-04
Attending: FAMILY MEDICINE | Admitting: FAMILY MEDICINE
Payer: COMMERCIAL

## 2017-04-04 ENCOUNTER — ANESTHESIA (OUTPATIENT)
Dept: ENDOSCOPY | Facility: HOSPITAL | Age: 47
End: 2017-04-04
Payer: COMMERCIAL

## 2017-04-04 ENCOUNTER — SURGERY (OUTPATIENT)
Age: 47
End: 2017-04-04

## 2017-04-04 VITALS
HEART RATE: 60 BPM | RESPIRATION RATE: 18 BRPM | SYSTOLIC BLOOD PRESSURE: 120 MMHG | DIASTOLIC BLOOD PRESSURE: 88 MMHG | RESPIRATION RATE: 12 BRPM | TEMPERATURE: 98 F | OXYGEN SATURATION: 98 %

## 2017-04-04 DIAGNOSIS — K21.00 GASTROESOPHAGEAL REFLUX DISEASE WITH ESOPHAGITIS: Primary | ICD-10-CM

## 2017-04-04 DIAGNOSIS — K29.30 CHRONIC SUPERFICIAL GASTRITIS WITHOUT BLEEDING: ICD-10-CM

## 2017-04-04 DIAGNOSIS — K29.60 EROSIVE GASTRITIS: ICD-10-CM

## 2017-04-04 DIAGNOSIS — R10.13 EPIGASTRIC ABDOMINAL PAIN: ICD-10-CM

## 2017-04-04 DIAGNOSIS — K92.1 MELENA: ICD-10-CM

## 2017-04-04 PROCEDURE — 88305 TISSUE EXAM BY PATHOLOGIST: CPT | Performed by: PATHOLOGY

## 2017-04-04 PROCEDURE — 25000003 PHARM REV CODE 250: Performed by: FAMILY MEDICINE

## 2017-04-04 PROCEDURE — 25000003 PHARM REV CODE 250: Performed by: NURSE ANESTHETIST, CERTIFIED REGISTERED

## 2017-04-04 PROCEDURE — 37000008 HC ANESTHESIA 1ST 15 MINUTES: Performed by: FAMILY MEDICINE

## 2017-04-04 PROCEDURE — 27201012 HC FORCEPS, HOT/COLD, DISP: Performed by: FAMILY MEDICINE

## 2017-04-04 PROCEDURE — 63600175 PHARM REV CODE 636 W HCPCS: Performed by: NURSE ANESTHETIST, CERTIFIED REGISTERED

## 2017-04-04 PROCEDURE — 43239 EGD BIOPSY SINGLE/MULTIPLE: CPT | Performed by: FAMILY MEDICINE

## 2017-04-04 PROCEDURE — 88305 TISSUE EXAM BY PATHOLOGIST: CPT | Mod: 26,,, | Performed by: PATHOLOGY

## 2017-04-04 PROCEDURE — 37000009 HC ANESTHESIA EA ADD 15 MINS: Performed by: FAMILY MEDICINE

## 2017-04-04 PROCEDURE — 43239 EGD BIOPSY SINGLE/MULTIPLE: CPT | Mod: ,,, | Performed by: FAMILY MEDICINE

## 2017-04-04 RX ORDER — LIDOCAINE HCL/PF 100 MG/5ML
SYRINGE (ML) INTRAVENOUS
Status: DISCONTINUED | OUTPATIENT
Start: 2017-04-04 | End: 2017-04-04

## 2017-04-04 RX ORDER — PROPOFOL 10 MG/ML
VIAL (ML) INTRAVENOUS
Status: DISCONTINUED | OUTPATIENT
Start: 2017-04-04 | End: 2017-04-04

## 2017-04-04 RX ORDER — SODIUM CHLORIDE, SODIUM LACTATE, POTASSIUM CHLORIDE, CALCIUM CHLORIDE 600; 310; 30; 20 MG/100ML; MG/100ML; MG/100ML; MG/100ML
INJECTION, SOLUTION INTRAVENOUS CONTINUOUS
Status: DISCONTINUED | OUTPATIENT
Start: 2017-04-04 | End: 2017-04-04 | Stop reason: HOSPADM

## 2017-04-04 RX ADMIN — SODIUM CHLORIDE, SODIUM LACTATE, POTASSIUM CHLORIDE, AND CALCIUM CHLORIDE: 600; 310; 30; 20 INJECTION, SOLUTION INTRAVENOUS at 08:04

## 2017-04-04 RX ADMIN — LIDOCAINE HYDROCHLORIDE 80 ML: 20 INJECTION, SOLUTION INTRAVENOUS at 08:04

## 2017-04-04 RX ADMIN — LIDOCAINE HYDROCHLORIDE 20 ML: 20 INJECTION, SOLUTION INTRAVENOUS at 09:04

## 2017-04-04 RX ADMIN — PROPOFOL 80 MG: 10 INJECTION, EMULSION INTRAVENOUS at 08:04

## 2017-04-04 RX ADMIN — BENZOCAINE, BUTAMBEN, AND TETRACAINE HYDROCHLORIDE 2 SPRAY: .028; .004; .004 AEROSOL, SPRAY TOPICAL at 08:04

## 2017-04-04 RX ADMIN — PROPOFOL 50 MG: 10 INJECTION, EMULSION INTRAVENOUS at 08:04

## 2017-04-04 RX ADMIN — LIDOCAINE HYDROCHLORIDE 20 ML: 20 INJECTION, SOLUTION INTRAVENOUS at 08:04

## 2017-04-04 RX ADMIN — SODIUM CHLORIDE, SODIUM LACTATE, POTASSIUM CHLORIDE, AND CALCIUM CHLORIDE: 600; 310; 30; 20 INJECTION, SOLUTION INTRAVENOUS at 07:04

## 2017-04-04 NOTE — OR NURSING
Final time out is completed and agreed by all staff.  Patient is sedated adequately for procedure. EGD

## 2017-04-04 NOTE — H&P
"       Short Stay Endoscopy History and Physical    PCP - Gomez Ta MD    Procedure - EGD  ASA - 2  Mallampati - per anesthesia  History of Anesthesia problems - no  Family history Anesthesia problems -  no     HPI:  This is a 46 y.o. male here for evaluation of :   Active Hospital Problems    Diagnosis  POA    *Melena [K92.1]  Yes    Epigastric abdominal pain [R10.13]  Yes    Gastroesophageal reflux disease with esophagitis [K21.0]  Yes      Resolved Hospital Problems    Diagnosis Date Resolved POA   No resolved problems to display.         He has a feeling like he constantly has burning and is full of gas. He awakens a lot with a feeling like he is bloated. It is in the epigastric area. He has had emesis in the AM and some of the times in the day if he would gag, he would have emesis. He was on the dexilant and it did not help so he went to an RX of sucralfate and he started it. It helped him. He has been on that for the last few days. He has stopped the dexilant. He has not had hematemesis or coffee grind emesis.   He has a burning in the epigastric area. He is not on NSAID"s or aSA. He is backing off of his caffeine. He has quit smoking. He drinks some ETOH. He eats spicy foods but has been avoiding this.   He has had black stool, He started that over the last 2 1/2 weeks. He has not had red stools.         He has a significant history of GERD and he has been controlled for years on prescription nexium. He has tried to double the dose of the OTC med (since the insurance would not cover it) and he still has gerd symptoms. In the AM, if he is not treated with the nexium, he notes that he is bloated in his abdomen and he has a gag reflex.   Getting up and moving around helps with this.   He had an EGD with DR. Leonard in 2006 and he had gastritis and esophagitis.   In 2011, I did an EGD and he had an esopahgeal ulcer and gastritis and H. Pylori which we treated. He had an irregular Z line but did not have " metaplasia or dysplasia.   He has used prilosec and this did not work for him. He has also had prevacid in the past and this did not help.  He has had colon polyps and is not due for a check now.     ROS:  CONSTITUTIONAL: Denies weight change,  fatigue, fevers, chills, night sweats.  CARDIOVASCULAR: Denies chest pain, shortness of breath, orthopnea and edema.  RESPIRATORY: Denies cough, hemoptysis, dyspnea, and wheezing.  GI: See HPI.    Medical History:   Past Medical History:   Diagnosis Date    Anxiety     Essential hypertension 9/7/2016    GERD (gastroesophageal reflux disease)     HTN (hypertension)        Surgical History:   Past Surgical History:   Procedure Laterality Date    VASECTOMY         Family History:  Family History   Problem Relation Age of Onset    Hypertension Mother     Cancer Paternal Uncle      thyroid cancer    Cancer Paternal Grandmother        Social History:   Social History   Substance Use Topics    Smoking status: Former Smoker     Packs/day: 0.25     Quit date: 11/21/2016    Smokeless tobacco: Never Used    Alcohol use 3.5 oz/week     7 Standard drinks or equivalent per week       Allergy  Review of patient's allergies indicates:   Allergen Reactions    Bupropion hcl Other (See Comments)    Benadryl [diphenhydramine hcl] Anxiety     Causes to shake       Medications:   No current facility-administered medications on file prior to encounter.      Current Outpatient Prescriptions on File Prior to Encounter   Medication Sig Dispense Refill    benazepril (LOTENSIN) 10 MG tablet Take 1 tablet (10 mg total) by mouth once daily. 30 tablet 11    dexlansoprazole (DEXILANT) 60 mg capsule Take 1 capsule (60 mg total) by mouth once daily. 30 capsule 11    fluoxetine (PROZAC) 20 MG capsule Take 1 capsule (20 mg total) by mouth once daily. 30 capsule 11    sucralfate (CARAFATE) 1 gram tablet Take 1 tablet (1 g total) by mouth 4 (four) times daily. 120 tablet 1       Physical  Exam:  Vital Signs:   Vitals:    04/04/17 0746   BP: 117/85     General Appearance: Well appearing in no acute distress  ENT: OP clear  Chest: CTA B  CV: RRR, no m/r/g  Abd: s/nt/nd/nabs  Ext: no edema    Labs:  Reviewed    IMP:  Active Hospital Problems    Diagnosis  POA    *Melena [K92.1]  Yes    Epigastric abdominal pain [R10.13]  Yes    Gastroesophageal reflux disease with esophagitis [K21.0]  Yes      Resolved Hospital Problems    Diagnosis Date Resolved POA   No resolved problems to display.         Plan:  I have explained the risks and benefits of endoscopy procedures to the patient including but not limited to bleeding, perforation, infection, and death. The patient wishes to proceed.

## 2017-04-04 NOTE — TRANSFER OF CARE
Anesthesia Transfer of Care Note    Patient: Nj Ovalle Jr.    Procedure(s) Performed: Procedure(s) (LRB):  ESOPHAGOGASTRODUODENOSCOPY (EGD)-schedule with me on 4/4/2017 (N/A)    Patient location: GI    Anesthesia Type: MAC    Transport from OR: Transported from OR on room air with adequate spontaneous ventilation    Post pain: adequate analgesia    Post assessment: no apparent anesthetic complications    Post vital signs: stable    Level of consciousness: awake, alert and oriented    Nausea/Vomiting: no nausea/vomiting    Complications: none          Last vitals:   Visit Vitals    /67    Resp 18    SpO2 96%

## 2017-04-04 NOTE — DISCHARGE SUMMARY
Endoscopy Discharge Summary      Admit Date: 4/4/2017    Discharge Date and Time:  4/4/2017 9:20 AM    Attending Physician: Gomez Ta MD     Discharge Physician: Gomez Ta MD     Principal Admitting Diagnoses: Melena         Discharge Diagnosis: The primary encounter diagnosis was Gastroesophageal reflux disease with esophagitis. Diagnoses of Melena, Epigastric abdominal pain, Erosive gastritis, and Chronic superficial gastritis without bleeding were also pertinent to this visit.     Discharged Condition: Good    Indication for Admission: Melena     Hospital Course: Patient was admitted for an inpatient procedure and tolerated the procedure well with no complications.    Significant Diagnostic Studies: EGD with cold biopsy    Pathology (if any):  Specimen (12h ago through future)    Start     Ordered    04/04/17 0903  Specimen to Pathology - Surgery  Once     Comments:  1. gastric erosion antrum Bx  2. Body erosions Bx  3. cardia gastritis Bx    04/04/17 0907          Estimated Blood Loss: 1 ml.    Discussed with: patient and family.    Disposition: Home.    Follow Up/Patient Instructions:   Current Discharge Medication List      CONTINUE these medications which have NOT CHANGED    Details   benazepril (LOTENSIN) 10 MG tablet Take 1 tablet (10 mg total) by mouth once daily.  Qty: 30 tablet, Refills: 11      dexlansoprazole (DEXILANT) 60 mg capsule Take 1 capsule (60 mg total) by mouth once daily.  Qty: 30 capsule, Refills: 11    Associated Diagnoses: Gastroesophageal reflux disease with esophagitis; Melena; Epigastric abdominal pain      fluoxetine (PROZAC) 20 MG capsule Take 1 capsule (20 mg total) by mouth once daily.  Qty: 30 capsule, Refills: 11      sucralfate (CARAFATE) 1 gram tablet Take 1 tablet (1 g total) by mouth 4 (four) times daily.  Qty: 120 tablet, Refills: 1    Associated Diagnoses: Melena; Epigastric abdominal pain; Gastroesophageal reflux disease with esophagitis                Discharge Procedure Orders  Diet general     Activity as tolerated     Call MD for:  temperature >100.4     Call MD for:  persistent nausea and vomiting     Call MD for:  severe uncontrolled pain     Call MD for:  difficulty breathing, headache or visual disturbances     Call MD for:  redness, tenderness, or signs of infection (pain, swelling, redness, odor or green/yellow discharge around incision site)     Call MD for:  hives     Call MD for:  persistent dizziness or light-headedness     No dressing needed         Follow-up Information     Follow up with Gomez Ta MD. Call in 1 week.    Specialty:  Family Medicine    Why:  To receive pathology results.    Contact information:    [address] [facility] [address] [phone]            [email or web address]

## 2017-04-04 NOTE — ANESTHESIA POSTPROCEDURE EVALUATION
Anesthesia Post Evaluation    Patient: Nj Ovalle Jr.    Procedure(s) Performed: Procedure(s) (LRB):  ESOPHAGOGASTRODUODENOSCOPY (EGD)-schedule with me on 4/4/2017 (N/A)    Final Anesthesia Type: MAC  Patient location during evaluation: GI PACU  Patient participation: Yes- Able to Participate  Level of consciousness: awake and alert and oriented  Post-procedure vital signs: reviewed and stable  Pain management: adequate  Airway patency: patent  PONV status at discharge: No PONV  Anesthetic complications: no      Cardiovascular status: blood pressure returned to baseline  Respiratory status: unassisted, spontaneous ventilation and room air  Hydration status: euvolemic  Follow-up not needed.        Visit Vitals    /67    Resp 18    SpO2 96%       Pain/Itzel Score: Pain Assessment Performed: Yes (4/4/2017  9:10 AM)  Presence of Pain: denies (4/4/2017  9:10 AM)  Pain Rating Prior to Med Admin: 0 (4/4/2017  9:10 AM)  Pain Rating Post Med Admin: 0 (4/4/2017  9:10 AM)  Itzel Score: 8 (4/4/2017  9:10 AM)

## 2017-04-04 NOTE — IP AVS SNAPSHOT
[address] [place] [address]           Patient Discharge Instructions   Our goal is to set you up for success. This packet includes information on your condition, medications, and your home care.  It will help you care for yourself to prevent having to return to the hospital.     Please ask your nurse if you have any questions.      There are many details to remember when preparing to leave the hospital. Here is what you will need to do:    1. Take your medicine. If you are prescribed medications, review your Medication List on the following pages. You may have new medications to  at the pharmacy and others that you'll need to stop taking. Review the instructions for how and when to take your medications. Talk with your doctor or nurses if you are unsure of what to do.     2. Go to your follow-up appointments. Specific follow-up information is listed in the following pages. Your may be contacted by a nurse or clinical provider about future appointments. Be sure we have all of the phone numbers to reach you. Please contact your provider's office if you are unable to make an appointment.     3. Watch for warning signs. Your doctor or nurse will give you detailed warning signs to watch for and when to call for assistance. These instructions may also include educational information about your condition. If you experience any of warning signs to your health, call your doctor.               ** Verify the list of medication(s) below is accurate and up to date. Carry this with you in case of emergency. If your medications have changed, please notify your healthcare provider.             Medication List      CONTINUE taking these medications        Additional Info                      benazepril 10 MG tablet   Commonly known as:  LOTENSIN   Quantity:  30 tablet   Refills:  11   Dose:  10 mg    Instructions:  Take 1 tablet (10 mg total) by mouth once daily.     Begin  Date    AM    Noon    PM    Bedtime       dexlansoprazole 60 mg capsule   Commonly known as:  DEXILANT   Quantity:  30 capsule   Refills:  11   Dose:  60 mg    Instructions:  Take 1 capsule (60 mg total) by mouth once daily.     Begin Date    AM    Noon    PM    Bedtime       fluoxetine 20 MG capsule   Commonly known as:  PROZAC   Quantity:  30 capsule   Refills:  11   Dose:  20 mg    Instructions:  Take 1 capsule (20 mg total) by mouth once daily.     Begin Date    AM    Noon    PM    Bedtime       sucralfate 1 gram tablet   Commonly known as:  CARAFATE   Quantity:  120 tablet   Refills:  1   Dose:  1 g    Instructions:  Take 1 tablet (1 g total) by mouth 4 (four) times daily.     Begin Date    AM    Noon    PM    Bedtime                  Please bring to all follow up appointments:    1. A copy of your discharge instructions.  2. All medicines you are currently taking in their original bottles.  3. Identification and insurance card.    Please arrive 15 minutes ahead of scheduled appointment time.    Please call 24 hours in advance if you must reschedule your appointment and/or time.        Follow-up Information     Follow up with Gomez Ta MD. Call in 1 week.    Specialty:  Family Medicine    Why:  To receive pathology results.    Contact information:    [address] [facility] [address]  [phone]          Discharge Instructions     Future Orders    Activity as tolerated     Call MD for:  difficulty breathing, headache or visual disturbances     Call MD for:  hives     Call MD for:  persistent dizziness or light-headedness     Call MD for:  persistent nausea and vomiting     Call MD for:  redness, tenderness, or signs of infection (pain, swelling, redness, odor or green/yellow discharge around incision site)     Call MD for:  severe uncontrolled pain     Call MD for:  temperature >100.4     Diet general     Questions:    Total calories:      Fat restriction, if any:      Protein restriction, if any:       Na restriction, if any:      Fluid restriction:      Additional restrictions:      No dressing needed         Discharge Instructions         Gastritis (Adult)    Gastritis is inflammation and irritation of the stomach lining. It can be present for a short time (acute) or be long lasting (chronic). Gastritis is often caused by infection with bacteria called H pylori. More than a third of people in the [place] have this bacteria in their bodies. In many cases, H pylori causes no problems or symptoms. In some people, though, the infection irritates the stomach lining and causes gastritis. Other causes of stomach irritation include drinking alcohol or taking pain-relieving medicines called NSAIDs (such as aspirin or ibuprofen).   Symptoms of gastritis can include:  · Abdominal pain or bloating  · Loss of appetite  · Nausea or vomiting  · Vomiting blood or having black stools  · Feeling more tired than usual  An inflamed and irritated stomach lining is more likely to develop a sore called an ulcer. To help prevent this, gastritis should be treated.  Home care  If needed, medicines may be prescribed. If you have H pylori infection, treating it will likely relieve your symptoms. Other changes can help reduce stomach irritation and help it heal.  · If you have been prescribed medicines for H pylori infection, take them as directed. Take all of the medicine until it is finished or your healthcare provider tells you to stop, even if you feel better.  · Your healthcare provider may recommend avoiding NSAIDs. If you take daily aspirin for your heart or other medical reasons, do not stop without talking to your healthcare provider first.  · Avoid drinking alcohol.  · Stop smoking. Smoking can irritate the stomach and delay healing. As much as possible, stay away from second hand smoke.  Follow-up care  Follow up with your healthcare provider, or as advised by our staff. Testing may be needed to check for inflammation or an  ulcer.  When to seek medical advice  Call your healthcare provider for any of the following:  · Stomach pain that gets worse or moves to the lower right abdomen (appendix area)  · Chest pain that appears or gets worse, or spreads to the back, neck, shoulder, or arm  · Frequent vomiting (cant keep down liquids)  · Blood in the stool or vomit (red or black in color)  · Feeling weak or dizzy  · Fever of 100.4ºF (38ºC) or higher, or as directed by your healthcare provider  Date Last Reviewed: 6/22/2015  © 7582-7919 [employer]. [address] [place] [address]. All rights reserved. This information is not intended as a substitute for professional medical care. Always follow your healthcare professional's instructions.            Primary Diagnosis     Your primary diagnosis was:  Black Stool Due To Blood      Admission Information     Date & Time Provider Department CSN    4/4/2017  7:25 AM Gomez Ta MD [facility] -  [id number]      Care Providers     Provider Role Specialty Primary office phone    Gomez Ta MD Attending Provider Family Medicine [phone]    Gomez Ta MD Surgeon  Family Medicine [phone]      Your Vitals Were     BP Pulse Temp Resp SpO2       127/67 (BP Location: Left arm, Patient Position: Lying, BP Method: Automatic) 70 98.2 °F (36.8 °C) (Oral) 18 96%       Recent Lab Values     No lab values to display.      Pending Labs     Order Current Status    Specimen to Pathology - Surgery Collected (04/04/17 0907)      Allergies as of 4/4/2017        Reactions    Bupropion Hcl Other (See Comments)    Benadryl [Diphenhydramine Hcl] Anxiety    Causes to shake      [facility]     [facility] Nurse Care Line - 24/7 Assistance  Unless otherwise directed by your provider, please contact [facility], our nurse care line that is available for 24/7 assistance.     Registered nurses in the [facility] provide clinical advisement,  health education, appointment booking, and other advisory services.  Call for this free service at [phone].        Advance Directives     An advance directive is a document which, in the event you are no longer able to make decisions for yourself, tells your healthcare team what kind of treatment you do or do not want to receive, or who you would like to make those decisions for you.  If you do not currently have an advance directive, [facility] encourages you to create one.  For more information call:  [phone]), [phone] ([phone]),  or log on to [email or web address].        Smoking Cessation     If you would like to quit smoking:   You may be eligible for free services if you are a [place] resident and started smoking cigarettes before September 1, 1988.  Call the Smoking Cessation Trust (SCT) toll free at (777) 665-7656 or (747) 478-3931.   Call 6-383-QUIT-NOW if you do not meet the above criteria.   Contact us via email: [email or web address]   View our website for more information: [email or web address]        Language Assistance Services     ATTENTION: Language assistance services are available, free of charge. Please call [phone].      ATENCIÓN: Si habla español, tiene a hirsch disposición servicios gratuitos de asistencia lingüística. Llame al [phone].     CHÚ Ý: N?u b?n nói Ti?ng Vi?t, có các d?ch v? h? tr? ngôn ng? mi?n phí dành cho b?n. G?i s? [phone].         [facility] complies with applicable Federal civil rights laws and does not discriminate on the basis of race, color, national origin, age, disability, or sex.

## 2017-04-04 NOTE — DISCHARGE INSTRUCTIONS
Gastritis (Adult)    Gastritis is inflammation and irritation of the stomach lining. It can be present for a short time (acute) or be long lasting (chronic). Gastritis is often caused by infection with bacteria called H pylori. More than a third of people in the [place] have this bacteria in their bodies. In many cases, H pylori causes no problems or symptoms. In some people, though, the infection irritates the stomach lining and causes gastritis. Other causes of stomach irritation include drinking alcohol or taking pain-relieving medicines called NSAIDs (such as aspirin or ibuprofen).   Symptoms of gastritis can include:  · Abdominal pain or bloating  · Loss of appetite  · Nausea or vomiting  · Vomiting blood or having black stools  · Feeling more tired than usual  An inflamed and irritated stomach lining is more likely to develop a sore called an ulcer. To help prevent this, gastritis should be treated.  Home care  If needed, medicines may be prescribed. If you have H pylori infection, treating it will likely relieve your symptoms. Other changes can help reduce stomach irritation and help it heal.  · If you have been prescribed medicines for H pylori infection, take them as directed. Take all of the medicine until it is finished or your healthcare provider tells you to stop, even if you feel better.  · Your healthcare provider may recommend avoiding NSAIDs. If you take daily aspirin for your heart or other medical reasons, do not stop without talking to your healthcare provider first.  · Avoid drinking alcohol.  · Stop smoking. Smoking can irritate the stomach and delay healing. As much as possible, stay away from second hand smoke.  Follow-up care  Follow up with your healthcare provider, or as advised by our staff. Testing may be needed to check for inflammation or an ulcer.  When to seek medical advice  Call your healthcare provider for any of the following:  · Stomach pain that gets worse or moves to the lower  right abdomen (appendix area)  · Chest pain that appears or gets worse, or spreads to the back, neck, shoulder, or arm  · Frequent vomiting (cant keep down liquids)  · Blood in the stool or vomit (red or black in color)  · Feeling weak or dizzy  · Fever of 100.4ºF (38ºC) or higher, or as directed by your healthcare provider  Date Last Reviewed: 6/22/2015  © 9758-6087 [employer]. [address] [place] [address]. All rights reserved. This information is not intended as a substitute for professional medical care. Always follow your healthcare professional's instructions.

## 2017-04-04 NOTE — ANESTHESIA RELEASE NOTE
Anesthesia Release from PACU Note    Patient: Nj Ovalle Jr.    Procedure(s) Performed: Procedure(s) (LRB):  ESOPHAGOGASTRODUODENOSCOPY (EGD)-schedule with me on 4/4/2017 (N/A)    Anesthesia type: MAC    Post pain: Adequate analgesia    Post assessment: no apparent anesthetic complications, tolerated procedure well and no evidence of recall    Last Vitals:   Visit Vitals    /67    Resp 18    SpO2 96%       Post vital signs: stable    Level of consciousness: awake, alert  and oriented    Nausea/Vomiting: no nausea/no vomiting    Complications: none    Airway Patency: patent    Respiratory: unassisted, spontaneous ventilation, room air    Cardiovascular: stable and blood pressure at baseline    Hydration: euvolemic

## 2017-04-04 NOTE — ANESTHESIA PREPROCEDURE EVALUATION
04/04/2017  Nj Ovalle Jr. is a 46 y.o., male.    [facility] Pre-op Assessment    I have reviewed the Patient Summary Reports.    I have reviewed the Nursing Notes.   I have reviewed the Medications.     Review of Systems  Anesthesia Hx:  Neg history of prior surgery. Denies Family Hx of Anesthesia complications.   Denies Personal Hx of Anesthesia complications.   Social:  Former Smoker, Alcohol Use    EENT/Dental:EENT/Dental Normal   Cardiovascular:   Hypertension    Pulmonary:  Pulmonary Normal    Renal/:  Renal/ Normal     Hepatic/GI:   GERD    Neurological:  Neurology Normal    Endocrine:  Endocrine Normal    Psych:  Psychiatric Normal           Physical Exam   Airway/Jaw/Neck:  Airway Findings: Mouth Opening: Normal Tongue: Normal  General Airway Assessment: Adult  Mallampati: II  Improves to II with phonation.  TM Distance: Normal, at least 6 cm       Chest/Lungs:  Chest/Lungs Findings: Normal Respiratory Rate     Heart/Vascular:  Heart Findings: Rate: Normal        Mental Status:  Mental Status Findings:  Cooperative, Alert and Oriented         Anesthesia Plan  Type of Anesthesia, risks & benefits discussed:  Anesthesia Type:  MAC  Patient's Preference:   Intra-op Monitoring Plan:   Intra-op Monitoring Plan Comments:   Post Op Pain Control Plan:   Post Op Pain Control Plan Comments:   Induction:    Beta Blocker:  Patient is not currently on a Beta-Blocker (No further documentation required).       Informed Consent: Patient understands risks and agrees with Anesthesia plan.  Questions answered. Anesthesia consent signed with patient.  ASA Score: 2     Day of Surgery Review of History & Physical: I have interviewed and examined the patient. I have reviewed the patient's H&P dated:

## 2017-04-18 ENCOUNTER — HOSPITAL ENCOUNTER (OUTPATIENT)
Dept: RADIOLOGY | Facility: HOSPITAL | Age: 47
Discharge: HOME | End: 2017-04-18
Payer: COMMERCIAL

## 2017-04-18 ENCOUNTER — OFFICE VISIT (OUTPATIENT)
Dept: FAMILY MEDICINE | Facility: CLINIC | Age: 47
End: 2017-04-18
Payer: COMMERCIAL

## 2017-04-18 VITALS
BODY MASS INDEX: 25.22 KG/M2 | SYSTOLIC BLOOD PRESSURE: 106 MMHG | DIASTOLIC BLOOD PRESSURE: 68 MMHG | WEIGHT: 170.31 LBS | HEART RATE: 65 BPM | HEIGHT: 69 IN

## 2017-04-18 DIAGNOSIS — R10.32 LEFT LOWER QUADRANT PAIN: Primary | ICD-10-CM

## 2017-04-18 DIAGNOSIS — R10.32 LEFT LOWER QUADRANT PAIN: ICD-10-CM

## 2017-04-18 PROCEDURE — 1160F RVW MEDS BY RX/DR IN RCRD: CPT | Mod: S$GLB,,,

## 2017-04-18 PROCEDURE — 3074F SYST BP LT 130 MM HG: CPT | Mod: S$GLB,,,

## 2017-04-18 PROCEDURE — 74000 XR ABDOMEN AP 1 VIEW: CPT | Mod: TC,PO

## 2017-04-18 PROCEDURE — 99213 OFFICE O/P EST LOW 20 MIN: CPT | Mod: S$GLB,,,

## 2017-04-18 PROCEDURE — 74000 XR ABDOMEN AP 1 VIEW: CPT | Mod: 26,,, | Performed by: RADIOLOGY

## 2017-04-18 PROCEDURE — 99999 PR PBB SHADOW E&M-EST. PATIENT-LVL III: CPT | Mod: PBBFAC,,,

## 2017-04-18 PROCEDURE — 3078F DIAST BP <80 MM HG: CPT | Mod: S$GLB,,,

## 2017-04-18 RX ORDER — HYOSCYAMINE SULFATE 0.12 MG/1
0.12 TABLET SUBLINGUAL EVERY 6 HOURS PRN
Qty: 40 TABLET | Refills: 0 | Status: SHIPPED | OUTPATIENT
Start: 2017-04-18 | End: 2017-05-31

## 2017-04-18 NOTE — PROGRESS NOTES
Subjective:       Patient ID: Nj Ovalle Jr. is a 46 y.o. male.    Chief Complaint: Abdominal Pain    HPI   Patient presents today with a one-week complaint of left lower quadrant abdominal pain.  He says the pain is an intermittent moderate intensity cramping sensation in his lower abdomen.  He cannot identify exacerbating or mitigating factors.  He does voice some associated bowel movement changes stating that he used to defecate 3 times daily now goes once to twice and it is smaller volume of stool.  He denies any hematochezia or melena he denies any associated nausea or vomiting.  The patient cannot relate the pain to eating or bowel movements.  He recently had a EGD that showed gastritis he is currently taking Dexilant and Carafate.     Review of Systems   Constitutional: Negative for activity change, appetite change, fatigue and unexpected weight change.   HENT: Negative.    Eyes: Negative.    Respiratory: Negative for cough, chest tightness, shortness of breath and wheezing.    Cardiovascular: Negative for chest pain, palpitations and leg swelling.   Gastrointestinal: Positive for abdominal pain. Negative for constipation, diarrhea, nausea and vomiting.   Endocrine: Negative.    Genitourinary: Negative.    Musculoskeletal: Negative.    Skin: Negative for color change.   Allergic/Immunologic: Negative.    Neurological: Negative for dizziness, weakness and light-headedness.   Hematological: Negative.    Psychiatric/Behavioral: Negative for sleep disturbance.         Objective:      Physical Exam   Constitutional: He is oriented to person, place, and time. Vital signs are normal. He appears well-developed and well-nourished. He is active and cooperative. No distress.   HENT:   Head: Normocephalic and atraumatic.   Eyes: Conjunctivae are normal. Pupils are equal, round, and reactive to light. No scleral icterus.   Neck: Normal range of motion. Neck supple.   Cardiovascular: Normal rate, regular rhythm,  normal heart sounds and intact distal pulses.  Exam reveals no gallop and no friction rub.    No murmur heard.  Pulmonary/Chest: Effort normal and breath sounds normal. No respiratory distress. He has no wheezes. He has no rales. He exhibits no tenderness.   Abdominal: Normal appearance and bowel sounds are normal. He exhibits no shifting dullness, no distension, no pulsatile liver, no fluid wave, no abdominal bruit, no ascites, no pulsatile midline mass and no mass. There is no hepatosplenomegaly. There is no tenderness. There is no rigidity, no rebound, no guarding, no CVA tenderness, no tenderness at McBurney's point and negative Montez's sign. No hernia.   Musculoskeletal: Normal range of motion. He exhibits no edema or tenderness.   Neurological: He is alert and oriented to person, place, and time. He exhibits normal muscle tone. Coordination normal.   Skin: Skin is warm and dry. No rash noted. He is not diaphoretic. No erythema. No pallor.   Psychiatric: He has a normal mood and affect. His speech is normal and behavior is normal. Judgment and thought content normal.       Assessment:       1. Left lower quadrant pain          Plan:   Left lower quadrant pain  -     X-Ray Abdomen AP 1 View; Future; Expected date: 4/18/17  -     hyoscyamine (LEVSIN/SL) 0.125 mg Subl; Place 1 tablet (0.125 mg total) under the tongue every 6 (six) hours as needed.  Dispense: 40 tablet; Refill: 0            Disclaimer: This note is prepared using voice recognition software.  As such there may be errors in the dictation.  It has not been proofread.

## 2017-05-11 ENCOUNTER — TELEPHONE (OUTPATIENT)
Dept: FAMILY MEDICINE | Facility: CLINIC | Age: 47
End: 2017-05-11

## 2017-05-11 RX ORDER — FLUOXETINE HYDROCHLORIDE 40 MG/1
40 CAPSULE ORAL DAILY
Qty: 30 CAPSULE | Refills: 0 | Status: SHIPPED | OUTPATIENT
Start: 2017-05-11 | End: 2017-05-31

## 2017-05-11 NOTE — TELEPHONE ENCOUNTER
----- Message from Jarred Albarran sent at 5/11/2017  8:41 AM CDT -----  Contact: pt  He's calling in regards to his RX medication for: Prosaic (generic), pt is asking for a dosage increase from 20 mg to 40 mg, [facility] [place], please advise, [phone] (home)

## 2017-05-11 NOTE — TELEPHONE ENCOUNTER
I have signed for the following orders AND/OR meds.  Please call the patient and ask the patient to schedule the testing AND/OR inform about any medications that were sent.      No orders of the defined types were placed in this encounter.        Medications Ordered This Encounter      fluoxetine (PROZAC) 40 MG capsule          Sig: Take 1 capsule (40 mg total) by mouth once daily.          Dispense:  30 capsule          Refill:  0

## 2017-05-31 ENCOUNTER — LAB VISIT (OUTPATIENT)
Dept: LAB | Facility: HOSPITAL | Age: 47
End: 2017-05-31
Attending: FAMILY MEDICINE
Payer: COMMERCIAL

## 2017-05-31 ENCOUNTER — OFFICE VISIT (OUTPATIENT)
Dept: FAMILY MEDICINE | Facility: CLINIC | Age: 47
End: 2017-05-31
Payer: COMMERCIAL

## 2017-05-31 VITALS
HEART RATE: 79 BPM | HEIGHT: 69 IN | DIASTOLIC BLOOD PRESSURE: 77 MMHG | SYSTOLIC BLOOD PRESSURE: 121 MMHG | BODY MASS INDEX: 23.79 KG/M2 | WEIGHT: 160.63 LBS

## 2017-05-31 DIAGNOSIS — R53.83 FATIGUE, UNSPECIFIED TYPE: ICD-10-CM

## 2017-05-31 DIAGNOSIS — R53.83 FATIGUE, UNSPECIFIED TYPE: Primary | ICD-10-CM

## 2017-05-31 DIAGNOSIS — F41.9 ANXIETY: ICD-10-CM

## 2017-05-31 DIAGNOSIS — R68.82 DECREASED LIBIDO: ICD-10-CM

## 2017-05-31 DIAGNOSIS — G47.33 OSA (OBSTRUCTIVE SLEEP APNEA): ICD-10-CM

## 2017-05-31 LAB
TESTOST SERPL-MCNC: 294 NG/DL
TSH SERPL DL<=0.005 MIU/L-ACNC: 1.66 UIU/ML

## 2017-05-31 PROCEDURE — 99999 PR PBB SHADOW E&M-EST. PATIENT-LVL III: CPT | Mod: PBBFAC,,, | Performed by: FAMILY MEDICINE

## 2017-05-31 PROCEDURE — 36415 COLL VENOUS BLD VENIPUNCTURE: CPT | Mod: PO

## 2017-05-31 PROCEDURE — 84403 ASSAY OF TOTAL TESTOSTERONE: CPT

## 2017-05-31 PROCEDURE — 99214 OFFICE O/P EST MOD 30 MIN: CPT | Mod: S$GLB,,, | Performed by: FAMILY MEDICINE

## 2017-05-31 PROCEDURE — 84443 ASSAY THYROID STIM HORMONE: CPT

## 2017-05-31 RX ORDER — BUPROPION HYDROCHLORIDE 150 MG/1
150 TABLET, EXTENDED RELEASE ORAL 2 TIMES DAILY
Qty: 60 TABLET | Refills: 1 | Status: SHIPPED | OUTPATIENT
Start: 2017-05-31 | End: 2017-08-28 | Stop reason: SDUPTHER

## 2017-05-31 NOTE — PROGRESS NOTES
Subjective:      Patient ID: Nj Ovalle Jr. is a 46 y.o. male.    Chief Complaint: Sexual Problem    HPI  He complains of having increased fatigue and he has had this for a long time. He thought that it might be the depression and did not tell me about this in the last visit but he had it as bad then.  His depression is not better on the prozac and he states that in the AM, he does have some stomach burning and emesis at times.   He does sleep about 7-8 hours of sleep a night.  Some nights, he might awaken early and can't go back to sleep.   He has known sleep apnea and he is not using a machine.  He states that he would need to be retested for this.   He had the test done about 8-9 years ago and it was abnormal and he never did get a machine.     EPWORTH SLEEPINESS SCALE (ESS) -- The ESS subjectively measures sleepiness as it occurs in ordinary life situations. It can be used as a screening test for excessive sleepiness or to follow an individual's subjective response to an intervention.    Eight situations were described to the patient and the following responses were obtained on a questionnaire.  The question was, in the following situations, what is the likelihood that sleep would be induced?  Sitting and reading 0   Watching television 2   Sitting inactively in a public place 1   Riding as a passenger in a car for one hour without a break 0   Lying down to rest in the afternoon when circumstances permit 3   Sitting and talking with someone 0   Sitting quietly after lunch without alcohol 1   Sitting in a car as the , while stopped for a few minutes in traffic 0   TOTAL POINTS 7     Each situation receives a score of zero to three, which is related to the likelihood that sleep will be induced:  0 = would never doze  1 = slight chance of dozing  2 = moderate chance of dozing  3 = high chance of dozing    Thus, the total ESS score can range from zero to 24, with higher scores correlating with  increasing degrees of sleepiness     1-6 points: Normal sleep   7-8 points: Average sleepiness   9-24 points: Abnormal (possibly pathologic) sleepiness     A score greater than ten is consistent with excessive sleepiness. This threshold between normal and abnormal subjective sleepiness is derived from an observational study of 180 adults. The mean ESS score was approximately six among healthy adults, compared to 16 or greater among patients with narcolepsy, idiopathic hypersomnia, or moderate to severe obstructive sleep apnea (LALA, defined as a respiratory disturbance index [RDI] greater than 15 events per hour).  The ESS can be performed in the examination room or waiting room. It is relatively simple and generally takes only a few minutes to complete. It should be repeated at subsequent visits to assess for change.  Correlation -- Subjective sleepiness measured by the ESS appears to correlate moderately with objective sleep tendency. In the largest population based study, the increased risk of falling asleep during a four session multiple sleep latency test (MSLT) was 30 percent and 69 percent in individuals with intermediate (ESS score six to 11) and high (ESS score >12) subjective sleepiness, respectively. Despite this study, the strength of the correlation between the ESS and objective measures of sleepiness remains controversial due to conflicting small studies:  Early studies demonstrated strong correlation between the ESS and MSLT. This included a study that reported that a score derived from only three of the situations -- sitting inactively in a public place, sitting quietly after lunch, and sitting in a car stopped for a few minutes -- correlated well with mean sleep latency measured by the MSLT.  More recent studies have found little or no correlation between the ESS and the MSLT. In addition, weak correlation and discordant results between the ESS and the maintenance of wakefulness test (MWT) have been  reported among patients with sleep apnea and narcolepsy. Advocates for the viewpoint that the ESS correlates poorly with the MSLT and MWT hypothesize that the tests measure different aspects of sleepiness.  Advantages -- The ESS is widely available, reliable for assessing subjective sleepiness, and can be performed easily and quickly.  Disadvantages -- The ESS cannot be used on multiple occasions throughout the day and is not a good measure of short-term acute sleep loss. Although the ESS score tends to increase with the severity of LALA and most patients with moderate to severe LALA have an ESS score greater than ten, the ESS should not be used to screen for LALA because it is neither sensitive nor specific for this purpose.    Lab Results   Component Value Date    WBC 6.41 04/03/2017    HGB 13.8 (L) 04/03/2017    HCT 43.0 04/03/2017    MCV 90 04/03/2017     04/03/2017         There are no preventive care reminders to display for this patient.    Past Medical History:  Past Medical History:   Diagnosis Date    Anxiety     Essential hypertension 9/7/2016    GERD (gastroesophageal reflux disease)     HTN (hypertension)     LALA (obstructive sleep apnea) 5/31/2017     Past Surgical History:   Procedure Laterality Date    VASECTOMY       Review of patient's allergies indicates:   Allergen Reactions    Bupropion hcl Other (See Comments)    Benadryl [diphenhydramine hcl] Anxiety     Causes to shake     Current Outpatient Prescriptions on File Prior to Visit   Medication Sig Dispense Refill    benazepril (LOTENSIN) 10 MG tablet Take 1 tablet (10 mg total) by mouth once daily. 30 tablet 11    dexlansoprazole (DEXILANT) 60 mg capsule Take 1 capsule (60 mg total) by mouth once daily. 30 capsule 11    fluoxetine (PROZAC) 40 MG capsule Take 1 capsule (40 mg total) by mouth once daily. 30 capsule 0    sucralfate (CARAFATE) 1 gram tablet Take 1 tablet (1 g total) by mouth 4 (four) times daily. 120 tablet 1     "[DISCONTINUED] hyoscyamine (LEVSIN/SL) 0.125 mg Subl Place 1 tablet (0.125 mg total) under the tongue every 6 (six) hours as needed. 40 tablet 0     No current facility-administered medications on file prior to visit.      Social History     Social History    Marital status:      Spouse name: N/A    Number of children: N/A    Years of education: N/A     Occupational History    Not on file.     Social History Main Topics    Smoking status: Former Smoker     Packs/day: 0.25     Quit date: 11/21/2016    Smokeless tobacco: Never Used    Alcohol use 3.5 oz/week     7 Standard drinks or equivalent per week    Drug use: No    Sexual activity: Yes     Partners: Female     Other Topics Concern    Not on file     Social History Narrative    No narrative on file     Family History   Problem Relation Age of Onset    Hypertension Mother     Cancer Paternal Uncle      thyroid cancer    Cancer Paternal Grandmother              Review of Systems   Constitutional: Positive for fatigue.   Respiratory: Negative for cough, shortness of breath and wheezing.    Cardiovascular: Negative for chest pain, palpitations and leg swelling.   Genitourinary: Negative for dysuria and hematuria.       Objective:   /77   Pulse 79   Ht 5' 9" (1.753 m)   Wt 72.8 kg (160 lb 9.7 oz)   BMI 23.72 kg/m²     Physical Exam   Constitutional: He is oriented to person, place, and time. He appears well-developed and well-nourished.   HENT:   Head: Normocephalic and atraumatic.   Right Ear: External ear normal.   Left Ear: External ear normal.   Nose: Nose normal.   Mouth/Throat: Oropharynx is clear and moist. No oropharyngeal exudate.   Eyes: Conjunctivae and EOM are normal. Pupils are equal, round, and reactive to light. Right eye exhibits no discharge. Left eye exhibits no discharge. No scleral icterus.   Neck: Normal range of motion. Neck supple. No JVD present. No thyromegaly present.   Cardiovascular: Normal rate, regular " rhythm, normal heart sounds and intact distal pulses.  Exam reveals no gallop and no friction rub.    No murmur heard.  Pulmonary/Chest: Effort normal and breath sounds normal. No respiratory distress. He has no wheezes. He has no rales. He exhibits no tenderness.   Abdominal: Soft. Bowel sounds are normal. He exhibits no distension and no mass. There is no tenderness. There is no rebound and no guarding.   Musculoskeletal: Normal range of motion. He exhibits no edema or tenderness.   Lymphadenopathy:     He has no cervical adenopathy.   Neurological: He is alert and oriented to person, place, and time. No cranial nerve deficit. Coordination normal.   Skin: Skin is warm and dry. He is not diaphoretic.   Psychiatric: He has a normal mood and affect.       Assessment:     1. Fatigue, unspecified type    2. LALA (obstructive sleep apnea)    3. Decreased libido    4. Anxiety         Plan:    Nj was seen today for sexual problem.    Diagnoses and all orders for this visit:    Fatigue, unspecified type  -     Testosterone; Future  -     TSH; Future  -     CPAP titration (Must have diagnosis of LALA from previous sleep study.); Future  -     buPROPion (WELLBUTRIN SR) 150 MG TBSR 12 hr tablet; Take 1 tablet (150 mg total) by mouth 2 (two) times daily. Start 1 po q day x 7 days then change to 1 po bid    LALA (obstructive sleep apnea)  -     CPAP titration (Must have diagnosis of LALA from previous sleep study.); Future    Decreased libido  -     CPAP titration (Must have diagnosis of LALA from previous sleep study.); Future    Anxiety  -     buPROPion (WELLBUTRIN SR) 150 MG TBSR 12 hr tablet; Take 1 tablet (150 mg total) by mouth 2 (two) times daily. Start 1 po q day x 7 days then change to 1 po bid      RTC in 1 mo for a follow up.  Wean the prozac by going to 20 mg po q day x 7 days then 20 mg po qod x 7 days then stop.

## 2017-06-02 NOTE — PROGRESS NOTES
I have reviewed the testosterone and it is on the low end of normal which may make him symptomatic.  Based on this, I offered him treatment for this.  Contact him and see what he decided.  If he is agreeable. I will treat as below.    Book the patient to receive 200 mg of depo testosterone by injection every 4 week(s).  Book a repeat testosterone level in 3 months (2 weeks after the third dose is done) and notify the patient of the date.

## 2017-06-06 ENCOUNTER — TELEPHONE (OUTPATIENT)
Dept: FAMILY MEDICINE | Facility: CLINIC | Age: 47
End: 2017-06-06

## 2017-06-06 DIAGNOSIS — E29.1 HYPOGONADISM IN MALE: Primary | ICD-10-CM

## 2017-06-06 DIAGNOSIS — E34.9 HYPOTESTOSTERONISM: Primary | ICD-10-CM

## 2017-06-06 RX ORDER — TESTOSTERONE CYPIONATE 200 MG/ML
200 INJECTION, SOLUTION INTRAMUSCULAR
Status: DISCONTINUED | OUTPATIENT
Start: 2017-06-07 | End: 2017-06-06 | Stop reason: CLARIF

## 2017-06-06 NOTE — TELEPHONE ENCOUNTER
I put in the orders for the labs in 3 1/2 months but I can't figure out in this system how to put in a depo testosterone 200 mg IM every 28 days.  Can you ask karyna how to put this in?

## 2017-06-06 NOTE — ADDENDUM NOTE
Encounter addended by: Lizabeth Wynn, PharmD on: 6/6/2017  3:34 PM<BR>    Actions taken:  activity accessed

## 2017-06-06 NOTE — TELEPHONE ENCOUNTER
I wrote an order for his testosterone to be given. george perry see the order and schedule him for that and the blood work I ordered in 14 weeks.

## 2017-06-06 NOTE — TELEPHONE ENCOUNTER
It needs to be done from an open encounter such as orders only  It can not be by a telephone call  I am not able to pend and forward orders only so It will need to be completed by you

## 2017-06-06 NOTE — TELEPHONE ENCOUNTER
----- Message from Gomez Ta MD sent at 6/2/2017  7:38 AM CDT -----  I have reviewed the testosterone and it is on the low end of normal which may make him symptomatic.  Based on this, I offered him treatment for this.  Contact him and see what he decided.  If he is agreeable. I will treat as below.      Book the patient to receive 200 mg of depo testosterone by injection every 4 week(s).  Book a repeat testosterone level in 3 months (2 weeks after the third dose is done) and notify the patient of the date.

## 2017-06-07 RX ORDER — TESTOSTERONE CYPIONATE 200 MG/ML
200 INJECTION, SOLUTION INTRAMUSCULAR
Status: DISCONTINUED | OUTPATIENT
Start: 2017-06-07 | End: 2017-07-12

## 2017-06-07 NOTE — TELEPHONE ENCOUNTER
The order for the testosterone injection on his MAR says there is an error with it and it has a line through it. Please advise.

## 2017-06-08 ENCOUNTER — CLINICAL SUPPORT (OUTPATIENT)
Dept: FAMILY MEDICINE | Facility: CLINIC | Age: 47
End: 2017-06-08
Payer: COMMERCIAL

## 2017-06-08 DIAGNOSIS — E34.9 HYPOTESTOSTERONISM: Primary | ICD-10-CM

## 2017-06-08 PROCEDURE — 99999 PR PBB SHADOW E&M-EST. PATIENT-LVL I: CPT | Mod: PBBFAC,,,

## 2017-06-08 PROCEDURE — 96372 THER/PROPH/DIAG INJ SC/IM: CPT | Mod: S$GLB,,, | Performed by: FAMILY MEDICINE

## 2017-06-08 RX ADMIN — TESTOSTERONE CYPIONATE 200 MG: 200 INJECTION, SOLUTION INTRAMUSCULAR at 11:06

## 2017-06-12 NOTE — TELEPHONE ENCOUNTER
I have signed for the following orders AND/OR meds.  Please call the patient and ask the patient to schedule the testing AND/OR inform about any medications that were sent.      Orders Placed This Encounter   Procedures    Testosterone     Standing Status:   Future     Standing Expiration Date:   8/5/2018

## 2017-06-15 ENCOUNTER — TELEPHONE (OUTPATIENT)
Dept: FAMILY MEDICINE | Facility: CLINIC | Age: 47
End: 2017-06-15

## 2017-06-15 ENCOUNTER — PATIENT MESSAGE (OUTPATIENT)
Dept: FAMILY MEDICINE | Facility: CLINIC | Age: 47
End: 2017-06-15

## 2017-06-15 DIAGNOSIS — R53.83 FATIGUE, UNSPECIFIED TYPE: Primary | ICD-10-CM

## 2017-06-15 NOTE — TELEPHONE ENCOUNTER
I have signed for the following orders AND/OR meds.  Please call the patient and ask the patient to schedule the testing AND/OR inform about any medications that were sent.      Orders Placed This Encounter   Procedures    Home Sleep Studies     Standing Status:   Future     Standing Expiration Date:   6/15/2018

## 2017-06-15 NOTE — TELEPHONE ENCOUNTER
----- Message from Gomez Ta MD sent at 6/15/2017 11:30 AM CDT -----  Order a home study for his sleeping disorder as the sleep study in the lab was denied.   Gomez

## 2017-06-16 ENCOUNTER — TELEPHONE (OUTPATIENT)
Dept: PULMONOLOGY | Facility: CLINIC | Age: 47
End: 2017-06-16

## 2017-06-29 ENCOUNTER — OFFICE VISIT (OUTPATIENT)
Dept: SLEEP MEDICINE | Facility: CLINIC | Age: 47
End: 2017-06-29
Payer: COMMERCIAL

## 2017-06-29 DIAGNOSIS — G47.33 OSA (OBSTRUCTIVE SLEEP APNEA): ICD-10-CM

## 2017-06-29 PROCEDURE — 99999 PR PBB SHADOW E&M-EST. PATIENT-LVL II: CPT | Mod: PBBFAC,,,

## 2017-06-29 PROCEDURE — 95806 SLEEP STUDY UNATT&RESP EFFT: CPT | Mod: S$GLB,,, | Performed by: INTERNAL MEDICINE

## 2017-06-29 PROCEDURE — 99499 UNLISTED E&M SERVICE: CPT | Mod: S$GLB,,, | Performed by: INTERNAL MEDICINE

## 2017-06-29 NOTE — Clinical Note
Assessment and Recommendations Monitor duration was 5 hours 1 minute. Excluded respiratory signal was high at 178 minutes. Excluded O2 signal was 0. Lowest oxygen saturation was 84%. Average heart rate was 87 bpm with a maximal heart rate of 101 bpm. Snoring was recorded above 50 dB for 87% of the time. Apnea-hypopnea index/respiratory event index: 36.8 events per hour. Total events 185. Severe obstructive sleep apnea-hypopnea syndrome is detected. Treatment is indicated based on the American academy sleep medicine practice parameters. CPAP. Either in lab CPAP polysomnography or AutoPap titrating device. Follow-up with sleep disorder clinic

## 2017-07-03 ENCOUNTER — TELEPHONE (OUTPATIENT)
Dept: PULMONOLOGY | Facility: CLINIC | Age: 47
End: 2017-07-03

## 2017-07-03 ENCOUNTER — OFFICE VISIT (OUTPATIENT)
Dept: PULMONOLOGY | Facility: CLINIC | Age: 47
End: 2017-07-03
Payer: COMMERCIAL

## 2017-07-03 VITALS
OXYGEN SATURATION: 96 % | DIASTOLIC BLOOD PRESSURE: 62 MMHG | BODY MASS INDEX: 24.28 KG/M2 | HEIGHT: 69 IN | WEIGHT: 163.94 LBS | HEART RATE: 96 BPM | RESPIRATION RATE: 18 BRPM | SYSTOLIC BLOOD PRESSURE: 100 MMHG

## 2017-07-03 DIAGNOSIS — K21.00 GASTROESOPHAGEAL REFLUX DISEASE WITH ESOPHAGITIS: ICD-10-CM

## 2017-07-03 DIAGNOSIS — G47.33 OSA (OBSTRUCTIVE SLEEP APNEA): Primary | ICD-10-CM

## 2017-07-03 PROCEDURE — 99204 OFFICE O/P NEW MOD 45 MIN: CPT | Mod: S$GLB,,, | Performed by: INTERNAL MEDICINE

## 2017-07-03 PROCEDURE — 99999 PR PBB SHADOW E&M-EST. PATIENT-LVL III: CPT | Mod: PBBFAC,,, | Performed by: INTERNAL MEDICINE

## 2017-07-03 NOTE — PROGRESS NOTES
"Subjective:       Patient ID: Nj Ovalle Jr. is a 46 y.o. male.    Chief Complaint: Sleep Apnea    Mr. Dg Ovalle is 46 years old  Referred to me by Dr. Gomez Ta  His accompanied by his wife for this visit  He had a home sleep study ordered by his primary care physician.  Study showed very severe obstructive sleep apnea hypopnea syndrome.  AHI was 36.8 events per hour total events 185.  Patient also had the hypoxemia related with the device.  Oxygen was as low as 84% during the entire night.  Patient has concurrent severe erosive gastritis that's worse during the night  He does take alcohol in the evening  No sleeping pills  [place] score is 4  We discussed about indications of treatment obstructive sleep apnea  CPAP is the treatment of choice  AutoPap device has been ordered for the patient            Review of Systems   Constitutional: Negative for fever, weight gain, fatigue and weakness.   HENT: Negative.    Eyes: Negative.    Respiratory: Positive for apnea and snoring. Negative for cough, sputum production, shortness of breath, wheezing, use of rescue inhaler and somnolence.    Cardiovascular: Negative.    Genitourinary: Negative.    Endocrine: endocrine negative   Musculoskeletal: Negative.    Skin: Negative.    Gastrointestinal: Negative.    Neurological: Negative.    Psychiatric/Behavioral: Positive for sleep disturbance.       Objective:       Vitals:    07/03/17 1530   BP: 100/62   Pulse: 96   Resp: 18   SpO2: 96%   Weight: 74.3 kg (163 lb 14.6 oz)   Height: 5' 9" (1.753 m)     Physical Exam   Constitutional: He is oriented to person, place, and time. He appears well-developed and well-nourished. He appears not cachectic. No distress.   HENT:   Head: Normocephalic.   Nose: Nose normal.   Mouth/Throat: Oropharynx is clear and moist. No oropharyngeal exudate. Mallampati Score: I.   Neck: Normal range of motion. Neck supple. No JVD present.   Cardiovascular: Normal rate, regular rhythm, " normal heart sounds and intact distal pulses.    No murmur heard.  Pulmonary/Chest: Normal expansion, symmetric chest wall expansion, effort normal and breath sounds normal.   Abdominal: Soft. Bowel sounds are normal.   Musculoskeletal: Normal range of motion. He exhibits no edema.   Lymphadenopathy:     He has no cervical adenopathy.   Neurological: He is alert and oriented to person, place, and time. He has normal reflexes. Gait normal.   Skin: Skin is warm and dry. No cyanosis. Nails show no clubbing.   Psychiatric: He has a normal mood and affect. His behavior is normal.   Nursing note and vitals reviewed.    Personal Diagnostic Review  HOME sleep study  Assessment and Recommendations  Monitor duration was 5 hours 1 minute.  Excluded respiratory signal was high at 178 minutes. Excluded O2 signal was 0.  Lowest oxygen saturation was 84%.  Average heart rate was 87 bpm with a maximal heart rate of 101 bpm.  Snoring was recorded above 50 dB for 87% of the time.  Apnea-hypopnea index/respiratory event index: 36.8 events per hour. Total events 185.  Severe obstructive sleep apnea-hypopnea syndrome is detected.  Treatment is indicated based on the American academy sleep medicine practice parameters. CPAP.  Either in lab CPAP polysomnography or AutoPap titrating device.  Follow-up with sleep disorder clinic  No flowsheet data found.      Assessment:       Problem List Items Addressed This Visit     Gastroesophageal reflux disease with esophagitis    LALA (obstructive sleep apnea) - Primary    Relevant Orders    CPAP FOR HOME USE      Other Visit Diagnoses    None.       Plan:           Return in about 8 weeks (around 8/28/2017) for Lavinia Pineda NP, Download, CPAP supplies.    This note was prepared using voice recognition system and is likely to have sound alike errors that may have been overlooked even after proof reading.  Please call me with any questions    Discussed diagnosis, its evaluation, treatment and usual  course. All questions answered.    Thank you for the courtesy of participating in the care of this patient: Dr Gomez Kline MD

## 2017-07-03 NOTE — PATIENT INSTRUCTIONS
What Are CPAP and Other Air Pressure Treatments?   Continuous positive air pressure (CPAP) uses gentle air pressure to hold the airway open. CPAP is often the most effective treatment for sleep apnea and severe snoring. It works very well for many people. But keep in mind that it can take several adjustments before the setup is right for you.   How CPAP Works   A small portable pump beside the bed sends air through a hose, which is held over your nose by a mask. Air is gently pushed through your airway. The air pressure nudges sagging tissues aside. This widens the airway so you can breathe better. CPAP may be combined with other kinds of therapy for sleep apnea.      A mask over the nose gently directs air into the throat to keep the airway open.      Types of Air Pressure Treatments   There are different types of CPAP. Your doctor or CPAP technician will help you decide which type is best for you:   Basic CPAP keeps the pressure constant all night long.   A bilevel device gives out more pressure when you breathe in and less when you breathe out.   An autoCPAP device automatically adjusts pressure throughout the night and in response to changes such as body position, sleep stage, and snoring.      Please call office [phone] for any questions

## 2017-07-12 ENCOUNTER — CLINICAL SUPPORT (OUTPATIENT)
Dept: FAMILY MEDICINE | Facility: CLINIC | Age: 47
End: 2017-07-12
Payer: COMMERCIAL

## 2017-07-12 DIAGNOSIS — E29.1 HYPOGONADISM IN MALE: ICD-10-CM

## 2017-07-12 DIAGNOSIS — E29.1 HYPOGONADISM IN MALE: Primary | ICD-10-CM

## 2017-07-12 PROCEDURE — 96372 THER/PROPH/DIAG INJ SC/IM: CPT | Mod: S$GLB,,, | Performed by: FAMILY MEDICINE

## 2017-07-12 RX ORDER — TESTOSTERONE CYPIONATE 200 MG/ML
200 INJECTION, SOLUTION INTRAMUSCULAR
Status: DISCONTINUED | OUTPATIENT
Start: 2017-07-12 | End: 2017-09-20

## 2017-07-12 RX ADMIN — TESTOSTERONE CYPIONATE 200 MG: 200 INJECTION, SOLUTION INTRAMUSCULAR at 03:07

## 2017-07-12 NOTE — PROGRESS NOTES
Patient received 200mg testosterone to the right ventrogluteal. Instructed patient to wait in waiting area for 15 minutes after injection  Patient tolerated well  Appointment booked

## 2017-08-10 ENCOUNTER — CLINICAL SUPPORT (OUTPATIENT)
Dept: FAMILY MEDICINE | Facility: CLINIC | Age: 47
End: 2017-08-10
Payer: COMMERCIAL

## 2017-08-10 DIAGNOSIS — E29.1 HYPOGONADISM IN MALE: Primary | ICD-10-CM

## 2017-08-10 PROCEDURE — 96372 THER/PROPH/DIAG INJ SC/IM: CPT | Mod: S$GLB,,, | Performed by: FAMILY MEDICINE

## 2017-08-10 PROCEDURE — 99999 PR PBB SHADOW E&M-EST. PATIENT-LVL I: CPT | Mod: PBBFAC,,,

## 2017-08-10 RX ADMIN — TESTOSTERONE CYPIONATE 200 MG: 200 INJECTION, SOLUTION INTRAMUSCULAR at 10:08

## 2017-08-28 DIAGNOSIS — R53.83 FATIGUE, UNSPECIFIED TYPE: ICD-10-CM

## 2017-08-28 DIAGNOSIS — F41.9 ANXIETY: ICD-10-CM

## 2017-08-28 RX ORDER — BUPROPION HYDROCHLORIDE 150 MG/1
TABLET, EXTENDED RELEASE ORAL
Qty: 60 TABLET | Refills: 11 | Status: SHIPPED | OUTPATIENT
Start: 2017-08-28 | End: 2017-09-11 | Stop reason: SDUPTHER

## 2017-09-11 ENCOUNTER — OFFICE VISIT (OUTPATIENT)
Dept: FAMILY MEDICINE | Facility: CLINIC | Age: 47
End: 2017-09-11
Payer: COMMERCIAL

## 2017-09-11 VITALS
DIASTOLIC BLOOD PRESSURE: 78 MMHG | SYSTOLIC BLOOD PRESSURE: 132 MMHG | WEIGHT: 188.81 LBS | TEMPERATURE: 99 F | HEIGHT: 69 IN | HEART RATE: 97 BPM | BODY MASS INDEX: 27.96 KG/M2

## 2017-09-11 DIAGNOSIS — F41.9 ANXIETY: ICD-10-CM

## 2017-09-11 DIAGNOSIS — E78.5 HYPERLIPIDEMIA, UNSPECIFIED HYPERLIPIDEMIA TYPE: ICD-10-CM

## 2017-09-11 DIAGNOSIS — R53.83 FATIGUE, UNSPECIFIED TYPE: ICD-10-CM

## 2017-09-11 DIAGNOSIS — I10 ESSENTIAL HYPERTENSION: Primary | ICD-10-CM

## 2017-09-11 PROCEDURE — 3078F DIAST BP <80 MM HG: CPT | Mod: S$GLB,,, | Performed by: FAMILY MEDICINE

## 2017-09-11 PROCEDURE — 99213 OFFICE O/P EST LOW 20 MIN: CPT | Mod: S$GLB,,, | Performed by: FAMILY MEDICINE

## 2017-09-11 PROCEDURE — 99999 PR PBB SHADOW E&M-EST. PATIENT-LVL III: CPT | Mod: PBBFAC,,, | Performed by: FAMILY MEDICINE

## 2017-09-11 PROCEDURE — 3075F SYST BP GE 130 - 139MM HG: CPT | Mod: S$GLB,,, | Performed by: FAMILY MEDICINE

## 2017-09-11 PROCEDURE — 3008F BODY MASS INDEX DOCD: CPT | Mod: S$GLB,,, | Performed by: FAMILY MEDICINE

## 2017-09-11 RX ORDER — BUPROPION HYDROCHLORIDE 150 MG/1
TABLET, EXTENDED RELEASE ORAL
Qty: 180 TABLET | Refills: 3 | Status: SHIPPED | OUTPATIENT
Start: 2017-09-11 | End: 2017-10-02

## 2017-09-11 RX ORDER — BENAZEPRIL HYDROCHLORIDE 10 MG/1
10 TABLET ORAL DAILY
Qty: 90 TABLET | Refills: 3 | Status: SHIPPED | OUTPATIENT
Start: 2017-09-11 | End: 2017-12-18

## 2017-09-11 NOTE — PROGRESS NOTES
Subjective:   Nj Ovalle Jr. is a 46 y.o. male with hypertension.  He is tolerating the medication well and is in excellent compliance.  The patient is experiencing no side effects.  Counseling was offered regarding low salt diets.  He has a reduced salt intake.  He denies chest pain, palpitations, shortness of breath, dyspnea on exertion, left or murmur neck pain, nausea, vomiting, diaphoresis, paroxysmal nocturnal dyspnea, and orthopnea.   The patient's Health Maintenance was reviewed and the following appears to be due at this time:  Health Maintenance Due   Topic Date Due    Influenza Vaccine  08/01/2017    Lipid Panel  09/07/2017       Outpatient Medications Prior to Visit   Medication Sig Dispense Refill    benazepril (LOTENSIN) 10 MG tablet Take 1 tablet (10 mg total) by mouth once daily. 30 tablet 11    buPROPion (WELLBUTRIN SR) 150 MG TBSR 12 hr tablet START ONE TABLET DAILY FOR SEVEN DAYS THEN CHANGE TO ONE TABLET TWICE DAILY 60 tablet 11    sucralfate (CARAFATE) 1 gram tablet Take 1 tablet (1 g total) by mouth 4 (four) times daily. 120 tablet 1     Facility-Administered Medications Prior to Visit   Medication Dose Route Frequency Provider Last Rate Last Dose    testosterone cypionate injection 200 mg  200 mg Intramuscular Q28 Days Gomez Ta MD   200 mg at 08/10/17 1003      I reviewed his ASCVD score.   The 10-year ASCVD risk score (Dereck RUDY Thomason., et al., 2013) is: 3.5%    Values used to calculate the score:      Age: 46 years      Sex: Male      Is Non- : No      Diabetic: No      Tobacco smoker: No      Systolic Blood Pressure: 132 mmHg      Is BP treated: Yes      HDL Cholesterol: 64 mg/dL      Total Cholesterol: 240 mg/dL  He has had hyperlipdiemia but is not on medicine.     Health Maintenance Due   Topic Date Due    Influenza Vaccine  08/01/2017    Lipid Panel  09/07/2017       Past Medical History:  Past Medical History:   Diagnosis Date    Anxiety   "   Essential hypertension 9/7/2016    GERD (gastroesophageal reflux disease)     HTN (hypertension)     LALA (obstructive sleep apnea) 5/31/2017     Past Surgical History:   Procedure Laterality Date    VASECTOMY       Review of patient's allergies indicates:   Allergen Reactions    Bupropion hcl Other (See Comments)    Benadryl [diphenhydramine hcl] Anxiety     Causes to shake     Current Outpatient Prescriptions on File Prior to Visit   Medication Sig Dispense Refill    benazepril (LOTENSIN) 10 MG tablet Take 1 tablet (10 mg total) by mouth once daily. 30 tablet 11    buPROPion (WELLBUTRIN SR) 150 MG TBSR 12 hr tablet START ONE TABLET DAILY FOR SEVEN DAYS THEN CHANGE TO ONE TABLET TWICE DAILY 60 tablet 11    sucralfate (CARAFATE) 1 gram tablet Take 1 tablet (1 g total) by mouth 4 (four) times daily. 120 tablet 1     Current Facility-Administered Medications on File Prior to Visit   Medication Dose Route Frequency Provider Last Rate Last Dose    testosterone cypionate injection 200 mg  200 mg Intramuscular Q28 Days Gomez Ta MD   200 mg at 08/10/17 1003     Social History     Social History    Marital status:      Spouse name: N/A    Number of children: N/A    Years of education: N/A     Occupational History    Not on file.     Social History Main Topics    Smoking status: Former Smoker     Packs/day: 0.25     Quit date: 11/21/2016    Smokeless tobacco: Never Used    Alcohol use 3.5 oz/week     7 Standard drinks or equivalent per week    Drug use: No    Sexual activity: Yes     Partners: Female     Other Topics Concern    Not on file     Social History Narrative    No narrative on file     Family History   Problem Relation Age of Onset    Hypertension Mother     Cancer Paternal Uncle      thyroid cancer    Cancer Paternal Grandmother            Objective:   /78   Pulse 97   Temp 98.6 °F (37 °C) (Oral)   Ht 5' 9" (1.753 m)   Wt 85.6 kg (188 lb 13.2 oz)   BMI 27.88 " kg/m²    Body mass index is 27.88 kg/m².  Genl:Alert and oriented in NAD.   Cardiovascular: Normal rate, regular rhythm, S1 normal, S2 normal and normal heart sounds.  Exam reveals no gallop, no S3, no S4 and no friction rub.    No murmur heard.  Pulmonary/Chest: Effort normal and breath sounds normal. No stridor. Not tachypneic. No respiratory distress. The patient has no wheezes. The patient has no rhonchi. The patient has no rales.   Skin: The patient is not diaphoretic.     Encounter Diagnoses   Name Primary?    Essential hypertension Yes    Hyperlipidemia, unspecified hyperlipidemia type        PLAN:    I am having Mr. Ovalle maintain his sucralfate, buPROPion, and benazepril. We will continue to administer testosterone cypionate.    Nj was seen today for follow-up.    Diagnoses and all orders for this visit:    Essential hypertension    Hyperlipidemia, unspecified hyperlipidemia type  -     Lipid panel; Future    Other orders  -     benazepril (LOTENSIN) 10 MG tablet; Take 1 tablet (10 mg total) by mouth once daily.         Orders Placed This Encounter   Procedures    Lipid panel     Standing Status:   Future     Standing Expiration Date:   9/12/2018     Use a low salt diet.   Exercise and diet enough to keep the BMI less than 30.

## 2017-09-13 ENCOUNTER — OFFICE VISIT (OUTPATIENT)
Dept: PULMONOLOGY | Facility: CLINIC | Age: 47
End: 2017-09-13
Payer: COMMERCIAL

## 2017-09-13 VITALS
HEART RATE: 86 BPM | WEIGHT: 172.81 LBS | SYSTOLIC BLOOD PRESSURE: 118 MMHG | OXYGEN SATURATION: 99 % | BODY MASS INDEX: 24.74 KG/M2 | DIASTOLIC BLOOD PRESSURE: 80 MMHG | RESPIRATION RATE: 18 BRPM | HEIGHT: 70 IN

## 2017-09-13 DIAGNOSIS — K21.00 GASTROESOPHAGEAL REFLUX DISEASE WITH ESOPHAGITIS: ICD-10-CM

## 2017-09-13 DIAGNOSIS — G47.33 OSA ON CPAP: Primary | ICD-10-CM

## 2017-09-13 DIAGNOSIS — F17.210 CIGARETTE NICOTINE DEPENDENCE WITHOUT COMPLICATION: ICD-10-CM

## 2017-09-13 PROCEDURE — 99999 PR PBB SHADOW E&M-EST. PATIENT-LVL III: CPT | Mod: PBBFAC,,, | Performed by: NURSE PRACTITIONER

## 2017-09-13 PROCEDURE — 3079F DIAST BP 80-89 MM HG: CPT | Mod: S$GLB,,, | Performed by: NURSE PRACTITIONER

## 2017-09-13 PROCEDURE — 99214 OFFICE O/P EST MOD 30 MIN: CPT | Mod: S$GLB,,, | Performed by: NURSE PRACTITIONER

## 2017-09-13 PROCEDURE — 3008F BODY MASS INDEX DOCD: CPT | Mod: S$GLB,,, | Performed by: NURSE PRACTITIONER

## 2017-09-13 PROCEDURE — 3074F SYST BP LT 130 MM HG: CPT | Mod: S$GLB,,, | Performed by: NURSE PRACTITIONER

## 2017-09-13 NOTE — PROGRESS NOTES
Subjective:      Patient ID: Nj Ovalle Jr. is a 46 y.o. male.    Chief Complaint: Sleep Apnea    HPI: Nj Ovalle Jr. is here for follow up for LALA and CPAP complaince assessment.   He is on Auto CPAP  of 5-20 cmH2O pressure.   He is compliant with CPAP use. Complaince download today reveals 100 % of days with greater than 4 hours of device use.   Patient reports benefit from CPAP use and denies snoring and excessive daytime sleepiness.   He is very pleased with beginning CPAP with no longer sleep during the day and especially please with no longer awakening with gasping.   His GERD has significantly improved also, down from 3 pillows to 1 pillow and no gerd symptoms during the night as prior to CPAP.   He has stopped all tobacco products, quit smoking in 11/2016 and suff use in 6/2017.   He continues to drink 3-6 beers in evening after work.  There is plan to quit or cut back on alcohol, not ready at this time.     Previous Report Reviewed: office notes     The following portions of the patient's history were reviewed and updated as appropriate: allergies, current medications, past family history, past medical history, past social history, past surgical history and problem list.    Review of Systems   Constitutional: Negative for fever, chills, weight loss, weight gain, activity change, appetite change, fatigue and night sweats.   HENT: Negative for postnasal drip, rhinorrhea, sinus pressure, voice change and congestion.    Eyes: Negative for redness and itching.   Respiratory: Negative for snoring, cough, sputum production, chest tightness, shortness of breath, wheezing, orthopnea, asthma nighttime symptoms, dyspnea on extertion, use of rescue inhaler and somnolence.    Cardiovascular: Negative.  Negative for chest pain, palpitations and leg swelling.   Genitourinary: Negative for difficulty urinating and hematuria.   Endocrine: Negative for cold intolerance and heat intolerance.   "  Musculoskeletal: Negative for arthralgias, gait problem, joint swelling and myalgias.   Skin: Negative.    Gastrointestinal: Negative for nausea, vomiting and abdominal pain. Acid reflux: controlled on nexium    Neurological: Negative for dizziness, weakness, light-headedness and headaches.   Hematological: Negative for adenopathy. No excessive bruising.   All other systems reviewed and are negative.     Objective:     Physical Exam   Constitutional: He is oriented to person, place, and time. Vital signs are normal. He appears well-developed and well-nourished. He is active and cooperative.  Non-toxic appearance. He does not appear ill. No distress.   HENT:   Head: Normocephalic and atraumatic.   Right Ear: External ear normal.   Left Ear: External ear normal.   Nose: Nose normal.   Mouth/Throat: Oropharynx is clear and moist. No oropharyngeal exudate.   Eyes: Conjunctivae are normal.   Neck: Normal range of motion. Neck supple.   Cardiovascular: Normal rate, regular rhythm, normal heart sounds and intact distal pulses.    Pulmonary/Chest: Effort normal and breath sounds normal.   Abdominal: Soft. Bowel sounds are normal.   Musculoskeletal: Normal range of motion.   Neurological: He is alert and oriented to person, place, and time. He has normal reflexes.   Skin: Skin is warm and dry.   Psychiatric: He has a normal mood and affect. His behavior is normal. Judgment and thought content normal.   Vitals reviewed.    Vitals:    09/13/17 1520   BP: 118/80   Pulse: 86   Resp: 18   SpO2: 99%   Weight: 78.4 kg (172 lb 13.5 oz)   Height: 5' 9.6" (1.768 m)     body mass index is 25.09 kg/m².    Personal Diagnostic Review    CPAP download  CPAP 5-20 cm  Compliance Summary  8/13/2017 - 9/11/2017 (30 days)  Days with Device Usage 30 days  Days without Device Usage 0 days  Percent Days with Device Usage 100.0%  Cumulative Usage 11 days 21 hrs. 51 mins. 15 secs.  Maximum Usage (1 Day) 12 hrs. 11 mins. 23 secs.  Average Usage " "(All Days) 9 hrs. 31 mins. 42 secs.  Average Usage (Days Used) 9 hrs. 31 mins. 42 secs.  Minimum Usage (1 Day) 7 hrs. 7 mins. 35 secs.  Percent of Days with Usage >= 4 Hours 100.0%  Percent of Days with Usage < 4 Hours 0.0%  Date Range  Total Blower Time 11 days 22 hrs. 8 mins. 19 secs.  Average AHI 9.0  Auto CPAP Summary  Auto CPAP Mean Pressure 8.2 cmH2O  Auto CPAP Peak Average Pressure 11.3 cmH2O  Average Device Pressure <= 90% of Time 10.6 cmH2O  Average Time in Large Leak Per Day 50 mins. 32 secs.    Patient benefits and is compliant    Assessment:     1. LALA on CPAP    2. Gastroesophageal reflux disease with esophagitis    3. Cigarette nicotine dependence without complication        Orders Placed This Encounter   Procedures    HME - OTHER     Change from auto CPAP to CPAP 11 cm  Change made in office 9/13/2017, please verify remotely.   Remote download from [facility] in 3 weeks.     Order Specific Question:   Type of Equipment:     Answer:   cpap     Order Specific Question:   Height:     Answer:   5' 9.6" (1.768 m)     Order Specific Question:   Weight:     Answer:   78.4 kg (172 lb 13.5 oz)     Order Specific Question:   Does patient have medical equipment at home?     Answer:   CPAP     Order Specific Question:   Vendor:     Answer:   [facility]     Order Specific Question:   Expected Date of Delivery:     Answer:   9/13/2017     Plan:     Problem List Items Addressed This Visit     Gastroesophageal reflux disease with esophagitis     Improved on somewhat nexium otc  Significant improvement with beginning CPAP.  Down from 3 pillows down to 1 pillow since on CPAP with no sensation of reflux          RESOLVED: Nicotine addiction     Complete smoking cessation 11/21/2016  Complete cessation of dipping tobacco June 2017.          LALA on CPAP - Primary     Benefits and compliant on Auto CPAP 5 -20 cm  AHI 9.0   90% of time CPAP 10.6 cm   Changed to CPAP 11 cm to determine if can improve AHI   Remote download in 3 " weeks.  Consider for in lab titration if AHI is not improved with adjustment in settings.          Relevant Orders    HME - OTHER      Other Visit Diagnoses    None.       ([facility] - [facility]). Reviewed therapeutic goals for positive airway pressure therapy CPAP  Ideal is usage 100% of nights for 6 - 8 hours per night. Minimum usage is 70% of night for at least 4 hours per night used. Pateint expressed understanding.     Return in about 6 months (around 3/13/2018) for CPAP compliance follow up, remote downloads in interim. .

## 2017-09-13 NOTE — ASSESSMENT & PLAN NOTE
Benefits and compliant on Auto CPAP 5 -20 cm  AHI 9.0   90% of time CPAP 10.6 cm   Changed to CPAP 11 cm to determine if can improve AHI   Remote download in 3 weeks.  Consider for in lab titration if AHI is not improved with adjustment in settings.

## 2017-09-13 NOTE — ASSESSMENT & PLAN NOTE
Improved on somewhat nexium otc  Significant improvement with beginning CPAP.  Down from 3 pillows down to 1 pillow since on CPAP with no sensation of reflux

## 2017-09-20 ENCOUNTER — LAB VISIT (OUTPATIENT)
Dept: LAB | Facility: HOSPITAL | Age: 47
End: 2017-09-20
Attending: FAMILY MEDICINE
Payer: COMMERCIAL

## 2017-09-20 ENCOUNTER — TELEPHONE (OUTPATIENT)
Dept: FAMILY MEDICINE | Facility: CLINIC | Age: 47
End: 2017-09-20

## 2017-09-20 DIAGNOSIS — E34.9 HYPOTESTOSTERONISM: ICD-10-CM

## 2017-09-20 LAB — TESTOST SERPL-MCNC: 366 NG/DL

## 2017-09-20 PROCEDURE — 84403 ASSAY OF TOTAL TESTOSTERONE: CPT

## 2017-09-20 PROCEDURE — 36415 COLL VENOUS BLD VENIPUNCTURE: CPT | Mod: PO

## 2017-09-20 RX ORDER — TESTOSTERONE CYPIONATE 200 MG/ML
200 INJECTION, SOLUTION INTRAMUSCULAR
Status: COMPLETED | OUTPATIENT
Start: 2017-09-20 | End: 2018-02-08

## 2017-09-21 ENCOUNTER — TELEPHONE (OUTPATIENT)
Dept: FAMILY MEDICINE | Facility: CLINIC | Age: 47
End: 2017-09-21

## 2017-09-21 ENCOUNTER — CLINICAL SUPPORT (OUTPATIENT)
Dept: FAMILY MEDICINE | Facility: CLINIC | Age: 47
End: 2017-09-21
Payer: COMMERCIAL

## 2017-09-21 DIAGNOSIS — I10 ESSENTIAL HYPERTENSION: Primary | ICD-10-CM

## 2017-09-21 DIAGNOSIS — E29.1 HYPOGONADISM IN MALE: Primary | ICD-10-CM

## 2017-09-21 PROCEDURE — 96372 THER/PROPH/DIAG INJ SC/IM: CPT | Mod: S$GLB,,, | Performed by: FAMILY MEDICINE

## 2017-09-21 PROCEDURE — 99999 PR PBB SHADOW E&M-EST. PATIENT-LVL I: CPT | Mod: PBBFAC,,,

## 2017-09-21 RX ADMIN — TESTOSTERONE CYPIONATE 200 MG: 200 INJECTION, SOLUTION INTRAMUSCULAR at 10:09

## 2017-09-21 NOTE — PATIENT INSTRUCTIONS
t rec'd testosterone 200 mg inj im in right hip as ordered w/o prob. Declined to wait in lobby x's 15 min post inj

## 2017-09-21 NOTE — TELEPHONE ENCOUNTER
----- Message from Gomez Ta MD sent at 9/20/2017  8:28 PM CDT -----  The testosterone is normal.  Continue the current dose and recheck in 6 months.

## 2017-10-02 ENCOUNTER — TELEPHONE (OUTPATIENT)
Dept: FAMILY MEDICINE | Facility: CLINIC | Age: 47
End: 2017-10-02

## 2017-10-02 RX ORDER — VENLAFAXINE 37.5 MG/1
TABLET ORAL
Qty: 77 TABLET | Refills: 0 | Status: SHIPPED | OUTPATIENT
Start: 2017-10-02 | End: 2017-12-18

## 2017-10-02 NOTE — TELEPHONE ENCOUNTER
Pt states still having problems with stress and anxiety while taking the Wellbutrin. Wants to know what the next step is. States he took Effexor a couple of years ago and if he has to he can get back on that. Wants your opinion.

## 2017-10-02 NOTE — TELEPHONE ENCOUNTER
OK.  It was note listed on the medcard that way.  Based on that, I would consider it a failure.  He has been on zoloft, effexor, lexapro and celexa in the past.  Find out his response to the effexor in the past.

## 2017-10-02 NOTE — TELEPHONE ENCOUNTER
----- Message from Bryn Clark sent at 10/2/2017  8:42 AM CDT -----  Contact: [phone]  Would like to consult with nurse about Wellbutrin. Pt states not working. Please call penelope at [phone]. Thx. lj

## 2017-10-02 NOTE — TELEPHONE ENCOUNTER
Call and tell him to stop the wellbutrin by going to 150 mg po bid x 7 days then 150 mg po q day x 7 days then stop.     At the same time, wean up on the venlafaxine as listed below.      Call the pharmacy and cancel the wellbutrin.     I have signed for the following orders AND/OR meds.  Please call the patient and ask the patient to schedule the testing AND/OR inform about any medications that were sent.      Orders Placed This Encounter   Procedures    (In Office Administered) Pneumococcal Conjugate Vaccine (13 Valent) (IM)    Influenza - High Dose (65+) (PF) (IM)         Medications Ordered This Encounter      venlafaxine (EFFEXOR) 37.5 MG Tab          Sig: Take 1 tab po q day x 7 days then 2 tabs po q day x 7 days then 4 po q day after that.          Dispense:  77 tablet          Refill:  0

## 2017-10-02 NOTE — TELEPHONE ENCOUNTER
Pt says he was okay on the Effexor in the past. However, when he missed a dose of it he would have issues.

## 2017-10-02 NOTE — TELEPHONE ENCOUNTER
----- Message from Bryn Clark sent at 10/2/2017  8:42 AM CDT -----  Contact: [phone]  Would like to consult with nurse about Wellbutrin. Pt states not working. Please call penelope at [phone]. Thx. lj

## 2017-10-02 NOTE — TELEPHONE ENCOUNTER
Ask him to change to two of the 150 mg tabs in the am and one of the 150 mg tabs in the PM and recheck with me in 2 weeks

## 2017-10-19 ENCOUNTER — CLINICAL SUPPORT (OUTPATIENT)
Dept: FAMILY MEDICINE | Facility: CLINIC | Age: 47
End: 2017-10-19
Payer: COMMERCIAL

## 2017-10-19 DIAGNOSIS — E29.1 HYPOGONADISM IN MALE: Primary | ICD-10-CM

## 2017-10-19 PROCEDURE — 96372 THER/PROPH/DIAG INJ SC/IM: CPT | Mod: S$GLB,,, | Performed by: FAMILY MEDICINE

## 2017-10-19 PROCEDURE — 99999 PR PBB SHADOW E&M-EST. PATIENT-LVL I: CPT | Mod: PBBFAC,,,

## 2017-10-19 RX ADMIN — TESTOSTERONE CYPIONATE 200 MG: 200 INJECTION, SOLUTION INTRAMUSCULAR at 10:10

## 2017-10-19 NOTE — PATIENT INSTRUCTIONS
Pt rec'd testosterone inj im in left hip as ordered w/o prob. Declined to wait in lobby x's 15 min post injr

## 2017-11-08 RX ORDER — VENLAFAXINE 75 MG/1
150 TABLET ORAL DAILY
Qty: 60 TABLET | Refills: 11 | OUTPATIENT
Start: 2017-11-08

## 2017-11-08 RX ORDER — VENLAFAXINE HYDROCHLORIDE 150 MG/1
150 CAPSULE, EXTENDED RELEASE ORAL DAILY
Qty: 30 CAPSULE | Refills: 11 | Status: SHIPPED | OUTPATIENT
Start: 2017-11-08 | End: 2018-11-14 | Stop reason: SDUPTHER

## 2017-11-08 NOTE — TELEPHONE ENCOUNTER
I have signed for the following orders AND/OR meds.  Please call the patient and ask the patient to schedule the testing AND/OR inform about any medications that were sent.      No orders of the defined types were placed in this encounter.        Medications Ordered This Encounter      venlafaxine (EFFEXOR-XR) 150 MG Cp24          Sig: Take 1 capsule (150 mg total) by mouth once daily.          Dispense:  30 capsule          Refill:  11

## 2017-11-16 ENCOUNTER — CLINICAL SUPPORT (OUTPATIENT)
Dept: FAMILY MEDICINE | Facility: CLINIC | Age: 47
End: 2017-11-16
Payer: COMMERCIAL

## 2017-11-16 DIAGNOSIS — E34.9 HYPOTESTOSTERONEMIA: Primary | ICD-10-CM

## 2017-11-16 PROCEDURE — 99999 PR PBB SHADOW E&M-EST. PATIENT-LVL I: CPT | Mod: PBBFAC,,,

## 2017-11-16 PROCEDURE — 96372 THER/PROPH/DIAG INJ SC/IM: CPT | Mod: S$GLB,,, | Performed by: FAMILY MEDICINE

## 2017-11-16 RX ADMIN — TESTOSTERONE CYPIONATE 200 MG: 200 INJECTION, SOLUTION INTRAMUSCULAR at 02:11

## 2017-12-14 ENCOUNTER — CLINICAL SUPPORT (OUTPATIENT)
Dept: FAMILY MEDICINE | Facility: CLINIC | Age: 47
End: 2017-12-14
Payer: COMMERCIAL

## 2017-12-14 DIAGNOSIS — E34.9 HYPOTESTOSTERONEMIA: Primary | ICD-10-CM

## 2017-12-14 PROCEDURE — 99999 PR PBB SHADOW E&M-EST. PATIENT-LVL I: CPT | Mod: PBBFAC,,,

## 2017-12-14 PROCEDURE — 96372 THER/PROPH/DIAG INJ SC/IM: CPT | Mod: S$GLB,,, | Performed by: FAMILY MEDICINE

## 2017-12-14 RX ADMIN — TESTOSTERONE CYPIONATE 200 MG: 200 INJECTION, SOLUTION INTRAMUSCULAR at 09:12

## 2017-12-18 ENCOUNTER — OFFICE VISIT (OUTPATIENT)
Dept: FAMILY MEDICINE | Facility: CLINIC | Age: 47
End: 2017-12-18
Payer: COMMERCIAL

## 2017-12-18 VITALS
DIASTOLIC BLOOD PRESSURE: 104 MMHG | BODY MASS INDEX: 26.81 KG/M2 | WEIGHT: 181 LBS | HEIGHT: 69 IN | SYSTOLIC BLOOD PRESSURE: 150 MMHG | TEMPERATURE: 98 F | HEART RATE: 103 BPM

## 2017-12-18 DIAGNOSIS — I10 ESSENTIAL HYPERTENSION: Primary | ICD-10-CM

## 2017-12-18 DIAGNOSIS — K60.2 ANAL FISSURE: ICD-10-CM

## 2017-12-18 DIAGNOSIS — K59.00 CONSTIPATION, UNSPECIFIED CONSTIPATION TYPE: ICD-10-CM

## 2017-12-18 PROCEDURE — 99999 PR PBB SHADOW E&M-EST. PATIENT-LVL III: CPT | Mod: PBBFAC,,, | Performed by: FAMILY MEDICINE

## 2017-12-18 PROCEDURE — 99214 OFFICE O/P EST MOD 30 MIN: CPT | Mod: S$GLB,,, | Performed by: FAMILY MEDICINE

## 2017-12-18 RX ORDER — BENAZEPRIL HYDROCHLORIDE 20 MG/1
20 TABLET ORAL DAILY
Qty: 30 TABLET | Refills: 11 | Status: SHIPPED | OUTPATIENT
Start: 2017-12-18 | End: 2018-12-15 | Stop reason: SDUPTHER

## 2017-12-18 NOTE — PROGRESS NOTES
"Subjective:      Patient ID: Nj Ovalle Jr. is a 47 y.o. male.    Chief Complaint: Rectal Bleeding    HPI     he has a complaint of rectal bleeding. He has had this 4 days starting about 9 days ago and it stopped after that. He had to strain to have a BM and his stool was larger than  Normal.  He has had to strain to have a Bm.  He had a csc ope in 2015.he has not had abdominal pain.  He has had bloating of the abdomen.  He has not had a change in the diet.  He had been on nexium OTC but he has not had this in a while.  He is just on carafate.  The patient presents with essential hypertension.  The patient is tolerating the medication well and is in excellent compliance.  The patient is experiencing no side effects.  Counseling was offered regarding low salt diets.  The patient has a reduced salt intake.  The patient denies chest pain, palpitations, shortness of breath, dyspnea on exertion, left or murmur neck pain, nausea, vomiting, diaphoresis, paroxysmal nocturnal dyspnea, and orthopnea.   BP (!) 150/104   Pulse 103   Temp 98.3 °F (36.8 °C) (Oral)   Ht 5' 9" (1.753 m)   Wt 82.1 kg (181 lb)   BMI 26.73 kg/m²   Health Maintenance Due   Topic Date Due    Influenza Vaccine  08/01/2017    Lipid Panel  09/07/2017   His blood pressure is pretty high today and even after recheck it remained high. He is on lotensin 10 mg po q day now.    Past Medical History:  Past Medical History:   Diagnosis Date    Anxiety     Essential hypertension 9/7/2016    GERD (gastroesophageal reflux disease)     HTN (hypertension)     LALA (obstructive sleep apnea) 5/31/2017     Past Surgical History:   Procedure Laterality Date    VASECTOMY       Review of patient's allergies indicates:   Allergen Reactions    Bupropion hcl Other (See Comments)    Benadryl [diphenhydramine hcl] Anxiety     Causes to shake     Current Outpatient Prescriptions on File Prior to Visit   Medication Sig Dispense Refill    sucralfate " (CARAFATE) 1 gram tablet Take 1 tablet (1 g total) by mouth 4 (four) times daily. 120 tablet 1    venlafaxine (EFFEXOR-XR) 150 MG Cp24 Take 1 capsule (150 mg total) by mouth once daily. 30 capsule 11    [DISCONTINUED] benazepril (LOTENSIN) 10 MG tablet Take 1 tablet (10 mg total) by mouth once daily. 90 tablet 3    [DISCONTINUED] venlafaxine (EFFEXOR) 37.5 MG Tab Take 1 tab po q day x 7 days then 2 tabs po q day x 7 days then 4 po q day after that. 77 tablet 0     Current Facility-Administered Medications on File Prior to Visit   Medication Dose Route Frequency Provider Last Rate Last Dose    testosterone cypionate injection 200 mg  200 mg Intramuscular Q28 Days Gomez Ta MD   200 mg at 12/14/17 0952     Social History     Social History    Marital status:      Spouse name: N/A    Number of children: N/A    Years of education: N/A     Occupational History    Not on file.     Social History Main Topics    Smoking status: Former Smoker     Packs/day: 0.25     Quit date: 11/21/2016    Smokeless tobacco: Former User     Types: Snuff     Quit date: 06/2017    Alcohol use 3.5 oz/week     7 Standard drinks or equivalent per week    Drug use: No    Sexual activity: Yes     Partners: Female     Other Topics Concern    Not on file     Social History Narrative    No narrative on file     Family History   Problem Relation Age of Onset    Hypertension Mother     Cancer Paternal Uncle      thyroid cancer    Cancer Paternal Grandmother            Review of Systems   Constitutional: Negative for chills and fever.   Respiratory: Negative for cough, shortness of breath and wheezing.    Cardiovascular: Negative for chest pain and palpitations.   Gastrointestinal: Positive for abdominal distention, anal bleeding and constipation. Negative for abdominal pain, diarrhea, nausea, rectal pain and vomiting.   Genitourinary: Negative for dysuria and hematuria.       Objective:   BP (!) 150/104   Pulse 103    "Temp 98.3 °F (36.8 °C) (Oral)   Ht 5' 9" (1.753 m)   Wt 82.1 kg (181 lb)   BMI 26.73 kg/m²     Physical Exam   Constitutional: He appears well-developed and well-nourished.   Cardiovascular: Normal rate, regular rhythm and normal heart sounds.  Exam reveals no gallop and no friction rub.    No murmur heard.  Pulmonary/Chest: Effort normal and breath sounds normal. No respiratory distress. He has no wheezes. He has no rales.   Genitourinary:         Musculoskeletal: He exhibits no edema.       Assessment:     1. Essential hypertension    2. Anal fissure    3. Constipation, unspecified constipation type        Plan:   Take one dose of miralax and then supplement with miralax daily to help with the stools.    Continue the increased fluid intake.  Nj was seen today for rectal bleeding.    Diagnoses and all orders for this visit:    Essential hypertension  -     benazepril (LOTENSIN) 20 MG tablet; Take 1 tablet (20 mg total) by mouth once daily.    Anal fissure    Constipation, unspecified constipation type    use proctocream hc prn flare. It is good now.  (bleeding)  Start high fiber diet, dulcolax daily and increase fluids.  We will try to address his constipation.    "

## 2018-01-09 ENCOUNTER — TELEPHONE (OUTPATIENT)
Dept: PULMONOLOGY | Facility: CLINIC | Age: 48
End: 2018-01-09

## 2018-01-11 ENCOUNTER — CLINICAL SUPPORT (OUTPATIENT)
Dept: FAMILY MEDICINE | Facility: CLINIC | Age: 48
End: 2018-01-11
Payer: COMMERCIAL

## 2018-01-11 VITALS — SYSTOLIC BLOOD PRESSURE: 131 MMHG | DIASTOLIC BLOOD PRESSURE: 92 MMHG | HEART RATE: 110 BPM

## 2018-01-11 DIAGNOSIS — I10 ESSENTIAL HYPERTENSION: Primary | ICD-10-CM

## 2018-01-11 DIAGNOSIS — E29.1 HYPOGONADISM IN MALE: ICD-10-CM

## 2018-01-11 PROCEDURE — 96372 THER/PROPH/DIAG INJ SC/IM: CPT | Mod: S$GLB,,, | Performed by: FAMILY MEDICINE

## 2018-01-11 PROCEDURE — 99999 PR PBB SHADOW E&M-EST. PATIENT-LVL II: CPT | Mod: PBBFAC,,,

## 2018-01-11 RX ADMIN — TESTOSTERONE CYPIONATE 200 MG: 200 INJECTION, SOLUTION INTRAMUSCULAR at 09:01

## 2018-01-11 NOTE — PROGRESS NOTES
Testosterone 200mg administered IM to right ventrogluteal, pt tolerated well with no complaints. Also here for bp check. BP reading 131/92 .

## 2018-02-01 ENCOUNTER — TELEPHONE (OUTPATIENT)
Dept: PULMONOLOGY | Facility: CLINIC | Age: 48
End: 2018-02-01

## 2018-02-01 NOTE — TELEPHONE ENCOUNTER
Patient called in regards to not receiving cpap supplies, patient given phone number to DME and instructed to contact [facility], patient stated understanding

## 2018-02-08 ENCOUNTER — CLINICAL SUPPORT (OUTPATIENT)
Dept: FAMILY MEDICINE | Facility: CLINIC | Age: 48
End: 2018-02-08
Payer: COMMERCIAL

## 2018-02-08 DIAGNOSIS — E29.1 HYPOGONADISM IN MALE: Primary | ICD-10-CM

## 2018-02-08 PROCEDURE — 96372 THER/PROPH/DIAG INJ SC/IM: CPT | Mod: S$GLB,,, | Performed by: FAMILY MEDICINE

## 2018-02-08 PROCEDURE — 99999 PR PBB SHADOW E&M-EST. PATIENT-LVL I: CPT | Mod: PBBFAC,,,

## 2018-02-08 RX ADMIN — TESTOSTERONE CYPIONATE 200 MG: 200 INJECTION, SOLUTION INTRAMUSCULAR at 08:02

## 2018-06-11 DIAGNOSIS — K92.1 MELENA: ICD-10-CM

## 2018-06-11 DIAGNOSIS — R10.13 EPIGASTRIC ABDOMINAL PAIN: ICD-10-CM

## 2018-06-11 DIAGNOSIS — K21.00 GASTROESOPHAGEAL REFLUX DISEASE WITH ESOPHAGITIS: ICD-10-CM

## 2018-06-17 RX ORDER — DEXLANSOPRAZOLE 60 MG/1
CAPSULE, DELAYED RELEASE ORAL
Qty: 30 CAPSULE | Refills: 11 | Status: SHIPPED | OUTPATIENT
Start: 2018-06-17 | End: 2019-07-20 | Stop reason: SDUPTHER

## 2018-10-09 RX ORDER — VENLAFAXINE HYDROCHLORIDE 150 MG/1
CAPSULE, EXTENDED RELEASE ORAL
Qty: 30 CAPSULE | Refills: 11 | OUTPATIENT
Start: 2018-10-09

## 2018-11-14 RX ORDER — VENLAFAXINE HYDROCHLORIDE 150 MG/1
CAPSULE, EXTENDED RELEASE ORAL
Qty: 30 CAPSULE | Refills: 11 | Status: SHIPPED | OUTPATIENT
Start: 2018-11-14 | End: 2019-11-26 | Stop reason: SDUPTHER

## 2018-12-15 DIAGNOSIS — I10 ESSENTIAL HYPERTENSION: ICD-10-CM

## 2018-12-15 RX ORDER — BENAZEPRIL HYDROCHLORIDE 20 MG/1
TABLET ORAL
Qty: 30 TABLET | Refills: 11 | Status: SHIPPED | OUTPATIENT
Start: 2018-12-15 | End: 2019-05-21 | Stop reason: ALTCHOICE

## 2019-05-21 ENCOUNTER — OFFICE VISIT (OUTPATIENT)
Dept: FAMILY MEDICINE | Facility: CLINIC | Age: 49
End: 2019-05-21
Payer: COMMERCIAL

## 2019-05-21 VITALS
SYSTOLIC BLOOD PRESSURE: 139 MMHG | TEMPERATURE: 99 F | HEIGHT: 69 IN | HEART RATE: 103 BPM | WEIGHT: 177 LBS | DIASTOLIC BLOOD PRESSURE: 95 MMHG | BODY MASS INDEX: 26.22 KG/M2

## 2019-05-21 DIAGNOSIS — Z12.5 SCREENING FOR PROSTATE CANCER: ICD-10-CM

## 2019-05-21 DIAGNOSIS — I10 ESSENTIAL HYPERTENSION: Primary | ICD-10-CM

## 2019-05-21 DIAGNOSIS — J32.9 SINUSITIS, UNSPECIFIED CHRONICITY, UNSPECIFIED LOCATION: ICD-10-CM

## 2019-05-21 DIAGNOSIS — Z23 IMMUNIZATION DUE: ICD-10-CM

## 2019-05-21 PROCEDURE — 99214 OFFICE O/P EST MOD 30 MIN: CPT | Mod: S$GLB,,, | Performed by: NURSE PRACTITIONER

## 2019-05-21 PROCEDURE — 99999 PR PBB SHADOW E&M-EST. PATIENT-LVL III: CPT | Mod: PBBFAC,,, | Performed by: NURSE PRACTITIONER

## 2019-05-21 PROCEDURE — 99214 PR OFFICE/OUTPT VISIT, EST, LEVL IV, 30-39 MIN: ICD-10-PCS | Mod: S$GLB,,, | Performed by: NURSE PRACTITIONER

## 2019-05-21 PROCEDURE — 99999 PR PBB SHADOW E&M-EST. PATIENT-LVL III: ICD-10-PCS | Mod: PBBFAC,,, | Performed by: NURSE PRACTITIONER

## 2019-05-21 RX ORDER — LOSARTAN POTASSIUM 100 MG/1
100 TABLET ORAL DAILY
Qty: 30 TABLET | Refills: 5 | Status: SHIPPED | OUTPATIENT
Start: 2019-05-21 | End: 2019-11-26 | Stop reason: SDUPTHER

## 2019-05-21 RX ORDER — PREDNISONE 20 MG/1
20 TABLET ORAL 2 TIMES DAILY
Qty: 10 TABLET | Refills: 0 | Status: SHIPPED | OUTPATIENT
Start: 2019-05-21 | End: 2019-05-26

## 2019-05-21 RX ORDER — AMOXICILLIN 875 MG/1
875 TABLET, FILM COATED ORAL EVERY 12 HOURS
Qty: 20 TABLET | Refills: 0 | Status: SHIPPED | OUTPATIENT
Start: 2019-05-21 | End: 2019-05-31

## 2019-05-21 NOTE — PROGRESS NOTES
Subjective:       Patient ID: Nj Ovalle Jr. is a 48 y.o. male.    Chief Complaint: Sinus Problem    Hypertension   This is a chronic problem. The current episode started more than 1 year ago. The problem is uncontrolled. Associated symptoms include headaches. Pertinent negatives include no chest pain, palpitations or shortness of breath. Past treatments include ACE inhibitors.   Sinus Problem   This is a new problem. The current episode started 1 to 4 weeks ago. The problem has been gradually worsening since onset. There has been no fever. His pain is at a severity of 4/10. Associated symptoms include congestion, headaches and sinus pressure. Pertinent negatives include no coughing, ear pain, shortness of breath or sore throat. Treatments tried: mucinex. The treatment provided no relief.     He is due for labs and is requesting shingles vaccine    Review of Systems   Constitutional: Negative for fatigue, fever and unexpected weight change.   HENT: Positive for congestion and sinus pressure. Negative for ear pain and sore throat.    Eyes: Negative.  Negative for pain and visual disturbance.   Respiratory: Negative for cough and shortness of breath.    Cardiovascular: Negative for chest pain and palpitations.   Gastrointestinal: Negative for abdominal pain, diarrhea, nausea and vomiting.   Genitourinary: Negative for dysuria and frequency.   Musculoskeletal: Negative for arthralgias and myalgias.   Skin: Negative for color change and rash.   Neurological: Positive for headaches. Negative for dizziness.   Psychiatric/Behavioral: Negative for sleep disturbance. The patient is not nervous/anxious.        Vitals:    05/21/19 1441   BP: (!) 139/95   Pulse: 103   Temp: 98.9 °F (37.2 °C)       Objective:     Current Outpatient Medications   Medication Sig Dispense Refill    DEXILANT 60 mg capsule TAKE ONE CAPSULE BY MOUTH EVERY DAY 30 capsule 11    venlafaxine (EFFEXOR-XR) 150 MG Cp24 TAKE ONE CAPSULE BY MOUTH  DAILY 30 capsule 11    amoxicillin (AMOXIL) 875 MG tablet Take 1 tablet (875 mg total) by mouth every 12 (twelve) hours. for 10 days 20 tablet 0    losartan (COZAAR) 100 MG tablet Take 1 tablet (100 mg total) by mouth once daily. 30 tablet 5    predniSONE (DELTASONE) 20 MG tablet Take 1 tablet (20 mg total) by mouth 2 (two) times daily. for 5 days 10 tablet 0    varicella-zoster [id number], PF, (SHINGRIX, PF,) 50 mcg/0.5 mL injection Inject 0.5 mLs into the muscle once. for 1 dose 0.5 mL 1     No current facility-administered medications for this visit.        Physical Exam   Constitutional: He is oriented to person, place, and time. He appears well-developed. No distress.   HENT:   Head: Normocephalic and atraumatic.   Right Ear: Tympanic membrane is injected.   Left Ear: Tympanic membrane is injected.   Nose: Mucosal edema and rhinorrhea present. Right sinus exhibits frontal sinus tenderness. Left sinus exhibits maxillary sinus tenderness and frontal sinus tenderness.   Mouth/Throat: Posterior oropharyngeal edema present.   + swelling to left cheek   Eyes: Pupils are equal, round, and reactive to light. EOM are normal.   Neck: Normal range of motion. Neck supple.   Cardiovascular: Normal rate and regular rhythm.   Pulmonary/Chest: Effort normal and breath sounds normal.   Constant throat clearing   Musculoskeletal: Normal range of motion.   Neurological: He is alert and oriented to person, place, and time.   Skin: Skin is warm and dry. No rash noted.   Psychiatric: He has a normal mood and affect. Thought content normal.   Nursing note and vitals reviewed.      Assessment:       1. Essential hypertension    2. Screening for prostate cancer    3. Sinusitis, unspecified chronicity, unspecified location    4. Immunization due        Plan:   Essential hypertension  -     losartan (COZAAR) 100 MG tablet; Take 1 tablet (100 mg total) by mouth once daily.  Dispense: 30 tablet; Refill: 5  -     Comprehensive metabolic  panel; Future; Expected date: 05/21/2019  -     Lipid panel; Future; Expected date: 05/21/2019  -     PSA, Screening; Future; Expected date: 05/21/2019    Screening for prostate cancer  -     PSA, Screening; Future; Expected date: 05/21/2019    Sinusitis, unspecified chronicity, unspecified location    Immunization due  -     varicella-zoster [id number], PF, (SHINGRIX, PF,) 50 mcg/0.5 mL injection; Inject 0.5 mLs into the muscle once. for 1 dose  Dispense: 0.5 mL; Refill: 1    Other orders  -     predniSONE (DELTASONE) 20 MG tablet; Take 1 tablet (20 mg total) by mouth 2 (two) times daily. for 5 days  Dispense: 10 tablet; Refill: 0  -     amoxicillin (AMOXIL) 875 MG tablet; Take 1 tablet (875 mg total) by mouth every 12 (twelve) hours. for 10 days  Dispense: 20 tablet; Refill: 0        Follow up in about 1 month (around 6/18/2019) for repeat blood pressure.    There are no Patient Instructions on file for this visit.

## 2019-05-24 ENCOUNTER — LAB VISIT (OUTPATIENT)
Dept: LAB | Facility: HOSPITAL | Age: 49
End: 2019-05-24
Attending: NURSE PRACTITIONER
Payer: COMMERCIAL

## 2019-05-24 DIAGNOSIS — Z12.5 SCREENING FOR PROSTATE CANCER: ICD-10-CM

## 2019-05-24 DIAGNOSIS — I10 ESSENTIAL HYPERTENSION: ICD-10-CM

## 2019-05-24 LAB
ALBUMIN SERPL BCP-MCNC: 4.6 G/DL (ref 3.5–5.2)
ALP SERPL-CCNC: 86 U/L (ref 55–135)
ALT SERPL W/O P-5'-P-CCNC: 30 U/L (ref 10–44)
ANION GAP SERPL CALC-SCNC: 9 MMOL/L (ref 8–16)
AST SERPL-CCNC: 23 U/L (ref 10–40)
BILIRUB SERPL-MCNC: 0.5 MG/DL (ref 0.1–1)
BUN SERPL-MCNC: 18 MG/DL (ref 6–20)
CALCIUM SERPL-MCNC: 10.4 MG/DL (ref 8.7–10.5)
CHLORIDE SERPL-SCNC: 104 MMOL/L (ref 95–110)
CHOLEST SERPL-MCNC: 279 MG/DL (ref 120–199)
CHOLEST/HDLC SERPL: 3 {RATIO} (ref 2–5)
CO2 SERPL-SCNC: 26 MMOL/L (ref 23–29)
COMPLEXED PSA SERPL-MCNC: 1.1 NG/ML (ref 0–4)
CREAT SERPL-MCNC: 1.3 MG/DL (ref 0.5–1.4)
EST. GFR  (AFRICAN AMERICAN): >60 ML/MIN/1.73 M^2
EST. GFR  (NON AFRICAN AMERICAN): >60 ML/MIN/1.73 M^2
GLUCOSE SERPL-MCNC: 127 MG/DL (ref 70–110)
HDLC SERPL-MCNC: 94 MG/DL (ref 40–75)
HDLC SERPL: 33.7 % (ref 20–50)
LDLC SERPL CALC-MCNC: 160.4 MG/DL (ref 63–159)
NONHDLC SERPL-MCNC: 185 MG/DL
POTASSIUM SERPL-SCNC: 5.7 MMOL/L (ref 3.5–5.1)
PROT SERPL-MCNC: 8.2 G/DL (ref 6–8.4)
SODIUM SERPL-SCNC: 139 MMOL/L (ref 136–145)
TRIGL SERPL-MCNC: 123 MG/DL (ref 30–150)

## 2019-05-24 PROCEDURE — 80053 COMPREHEN METABOLIC PANEL: CPT

## 2019-05-24 PROCEDURE — 84153 ASSAY OF PSA TOTAL: CPT

## 2019-05-24 PROCEDURE — 36415 COLL VENOUS BLD VENIPUNCTURE: CPT | Mod: PO

## 2019-05-24 PROCEDURE — 80061 LIPID PANEL: CPT

## 2019-05-28 DIAGNOSIS — E87.5 HYPERKALEMIA: Primary | ICD-10-CM

## 2019-05-28 DIAGNOSIS — R73.9 HYPERGLYCEMIA: ICD-10-CM

## 2019-06-05 ENCOUNTER — TELEPHONE (OUTPATIENT)
Dept: FAMILY MEDICINE | Facility: CLINIC | Age: 49
End: 2019-06-05

## 2019-06-06 ENCOUNTER — TELEPHONE (OUTPATIENT)
Dept: FAMILY MEDICINE | Facility: CLINIC | Age: 49
End: 2019-06-06

## 2019-06-06 ENCOUNTER — LAB VISIT (OUTPATIENT)
Dept: LAB | Facility: HOSPITAL | Age: 49
End: 2019-06-06
Attending: NURSE PRACTITIONER
Payer: COMMERCIAL

## 2019-06-06 DIAGNOSIS — R73.9 HYPERGLYCEMIA: ICD-10-CM

## 2019-06-06 DIAGNOSIS — E87.5 HYPERKALEMIA: ICD-10-CM

## 2019-06-06 LAB
GLUCOSE SERPL-MCNC: 88 MG/DL (ref 70–110)
POTASSIUM SERPL-SCNC: 4.9 MMOL/L (ref 3.5–5.1)

## 2019-06-06 PROCEDURE — 82947 ASSAY GLUCOSE BLOOD QUANT: CPT

## 2019-06-06 PROCEDURE — 36415 COLL VENOUS BLD VENIPUNCTURE: CPT | Mod: PO

## 2019-06-06 PROCEDURE — 84132 ASSAY OF SERUM POTASSIUM: CPT

## 2019-06-06 NOTE — TELEPHONE ENCOUNTER
Printed out lab flowsheet results and handed them to the pt. I informed pt that Ms Andre OANH had sent a result note through the pt's [employer] profile and he should be able to read them there. Gave pt the [employer] support line phone number to reset his password as requested.

## 2019-06-06 NOTE — TELEPHONE ENCOUNTER
----- Message from Priyanka Patel sent at 6/6/2019  8:01 AM CDT -----  Contact: [phone]/pt waiting in the lobby  Pt is in the lobby asking for a copy of his lab results from 05/24/2019/please advise/thanks

## 2019-07-17 ENCOUNTER — TELEPHONE (OUTPATIENT)
Dept: FAMILY MEDICINE | Facility: CLINIC | Age: 49
End: 2019-07-17

## 2019-07-17 NOTE — TELEPHONE ENCOUNTER
----- Message from Abigail Cooley sent at 7/17/2019 11:12 AM CDT -----  Contact: pt   Type:  Test Results    Who Called:  Pt   Name of Test (Lab/Mammo/Etc):  lab  Date of Test:  Two weeks ago   Ordering Provider:  MARK Andre  Where the test was performed:  [facility]  Would the patient rather a call back or a response via [facility]? Call back   Best Call Back Number: [phone]  Additional Information:

## 2019-07-17 NOTE — TELEPHONE ENCOUNTER
Returned call to pt. No answer. Pt vm  Box was not set up to store messages. Could not leave a vm message.

## 2019-07-20 DIAGNOSIS — K21.00 GASTROESOPHAGEAL REFLUX DISEASE WITH ESOPHAGITIS: ICD-10-CM

## 2019-07-20 DIAGNOSIS — R10.13 EPIGASTRIC ABDOMINAL PAIN: ICD-10-CM

## 2019-07-20 DIAGNOSIS — K92.1 MELENA: ICD-10-CM

## 2019-07-20 RX ORDER — DEXLANSOPRAZOLE 60 MG/1
CAPSULE, DELAYED RELEASE ORAL
Qty: 30 CAPSULE | Refills: 11 | Status: SHIPPED | OUTPATIENT
Start: 2019-07-20 | End: 2020-02-06 | Stop reason: SDUPTHER

## 2019-08-22 ENCOUNTER — TELEPHONE (OUTPATIENT)
Dept: FAMILY MEDICINE | Facility: CLINIC | Age: 49
End: 2019-08-22

## 2019-08-22 ENCOUNTER — PATIENT OUTREACH (OUTPATIENT)
Dept: ADMINISTRATIVE | Facility: HOSPITAL | Age: 49
End: 2019-08-22

## 2019-08-22 NOTE — TELEPHONE ENCOUNTER
----- Message from Cristal Ybarra LPN sent at 8/22/2019  3:33 PM CDT -----   Pt stated he attempt to schedule an appt with Dr. Ta within the next 2-3  week without success. Would like a call from nurse to assist with an appt or if any cancellations.  Thank you

## 2019-08-22 NOTE — Clinical Note
Pt stated he attempt to schedule an appt with Dr. Ta within the next 2-3  week without success. Would like a call from nurse to assist with an appt or if any cancellations.  Thank you

## 2019-08-22 NOTE — PROGRESS NOTES
Called pt concerning an annual check up with Dr. Ta via HTN report.  Pt stated he attempt to schedule an appt with Dr. Ta within the next 2-3  week without success. Would like a call from nurse to assist with his appt or if any cancellations.  Message sent to Dr. Ta staff.

## 2019-09-11 ENCOUNTER — PATIENT OUTREACH (OUTPATIENT)
Dept: ADMINISTRATIVE | Facility: HOSPITAL | Age: 49
End: 2019-09-11

## 2019-09-11 NOTE — PROGRESS NOTES
Health Maintenance reviewed. Colonoscopy report dated 2/11/2015 performed at [facility] by Dr. Ta sent to [facility] for media updated.

## 2019-09-18 ENCOUNTER — PATIENT OUTREACH (OUTPATIENT)
Dept: ADMINISTRATIVE | Facility: HOSPITAL | Age: 49
End: 2019-09-18

## 2019-11-06 ENCOUNTER — PATIENT OUTREACH (OUTPATIENT)
Dept: ADMINISTRATIVE | Facility: HOSPITAL | Age: 49
End: 2019-11-06

## 2019-11-06 NOTE — PROGRESS NOTES
Spoke with pt regarding Nurse visit for b/p check via HTN report.  Pt scheduled nurse visit for 11/14/19 at 8:45.

## 2019-11-26 DIAGNOSIS — I10 ESSENTIAL HYPERTENSION: ICD-10-CM

## 2019-11-26 RX ORDER — VENLAFAXINE HYDROCHLORIDE 150 MG/1
CAPSULE, EXTENDED RELEASE ORAL
Qty: 30 CAPSULE | Refills: 0 | Status: SHIPPED | OUTPATIENT
Start: 2019-11-26 | End: 2019-12-29

## 2019-11-26 RX ORDER — LOSARTAN POTASSIUM 100 MG/1
TABLET ORAL
Qty: 30 TABLET | Refills: 11 | Status: SHIPPED | OUTPATIENT
Start: 2019-11-26 | End: 2020-10-26 | Stop reason: SDUPTHER

## 2019-11-26 NOTE — TELEPHONE ENCOUNTER
I refilled the requested medication x 1 month.  effexor can cause his bp to be up and it was up on the last visit. Please schedule with an ancillary nurse to check it to see if it is continuing to be up or not.

## 2019-12-29 RX ORDER — VENLAFAXINE HYDROCHLORIDE 150 MG/1
CAPSULE, EXTENDED RELEASE ORAL
Qty: 30 CAPSULE | Refills: 0 | Status: SHIPPED | OUTPATIENT
Start: 2019-12-29 | End: 2020-01-31

## 2019-12-30 NOTE — TELEPHONE ENCOUNTER
The patient is overdue to see me as a provider to maintain me as PCP in the clinic by [facility] because others have been providing care to the patient.  Please arrange for the patient to see me in the near future to update meds/labs and to maintain current patient status.  It if appears to be appropriate, a telehealth visit may be used for this.  I have refilled the requested medicine that prompted this review x 1.

## 2020-01-31 ENCOUNTER — TELEPHONE (OUTPATIENT)
Dept: FAMILY MEDICINE | Facility: CLINIC | Age: 50
End: 2020-01-31

## 2020-01-31 RX ORDER — VENLAFAXINE HYDROCHLORIDE 150 MG/1
CAPSULE, EXTENDED RELEASE ORAL
Qty: 30 CAPSULE | Refills: 0 | Status: SHIPPED | OUTPATIENT
Start: 2020-01-31 | End: 2020-02-06 | Stop reason: SDUPTHER

## 2020-01-31 NOTE — TELEPHONE ENCOUNTER
The patient requested a refill of BP meds.  The last 3 BP's are as follows:  BP Readings from Last 3 Encounters:   05/21/19 (!) 139/95   01/11/18 (!) 131/92   12/18/17 (!) 150/104     The last BP was high so please book the patient for a visit with the NP to adjust medications.

## 2020-02-06 ENCOUNTER — OFFICE VISIT (OUTPATIENT)
Dept: FAMILY MEDICINE | Facility: CLINIC | Age: 50
End: 2020-02-06
Payer: COMMERCIAL

## 2020-02-06 VITALS
HEIGHT: 69 IN | DIASTOLIC BLOOD PRESSURE: 80 MMHG | HEART RATE: 70 BPM | WEIGHT: 180.81 LBS | TEMPERATURE: 98 F | BODY MASS INDEX: 26.78 KG/M2 | SYSTOLIC BLOOD PRESSURE: 112 MMHG

## 2020-02-06 DIAGNOSIS — K21.00 GASTROESOPHAGEAL REFLUX DISEASE WITH ESOPHAGITIS: ICD-10-CM

## 2020-02-06 DIAGNOSIS — Z00.00 ANNUAL PHYSICAL EXAM: Primary | ICD-10-CM

## 2020-02-06 DIAGNOSIS — Z12.5 SCREENING FOR PROSTATE CANCER: ICD-10-CM

## 2020-02-06 DIAGNOSIS — I10 ESSENTIAL HYPERTENSION: ICD-10-CM

## 2020-02-06 PROCEDURE — 99396 PR PREVENTIVE VISIT,EST,40-64: ICD-10-PCS | Mod: S$GLB,,, | Performed by: NURSE PRACTITIONER

## 2020-02-06 PROCEDURE — 99396 PREV VISIT EST AGE 40-64: CPT | Mod: S$GLB,,, | Performed by: NURSE PRACTITIONER

## 2020-02-06 PROCEDURE — 99999 PR PBB SHADOW E&M-EST. PATIENT-LVL III: ICD-10-PCS | Mod: PBBFAC,,, | Performed by: NURSE PRACTITIONER

## 2020-02-06 PROCEDURE — 99999 PR PBB SHADOW E&M-EST. PATIENT-LVL III: CPT | Mod: PBBFAC,,, | Performed by: NURSE PRACTITIONER

## 2020-02-06 RX ORDER — VENLAFAXINE HYDROCHLORIDE 150 MG/1
150 CAPSULE, EXTENDED RELEASE ORAL DAILY
Qty: 90 CAPSULE | Refills: 3 | Status: SHIPPED | OUTPATIENT
Start: 2020-02-06 | End: 2020-10-26 | Stop reason: SDUPTHER

## 2020-02-06 RX ORDER — ESOMEPRAZOLE MAGNESIUM 40 MG/1
40 CAPSULE, DELAYED RELEASE ORAL DAILY
Qty: 90 CAPSULE | Refills: 3 | Status: SHIPPED | OUTPATIENT
Start: 2020-02-06 | End: 2020-10-26 | Stop reason: ALTCHOICE

## 2020-02-06 NOTE — PROGRESS NOTES
Subjective:       Patient ID: Nj Ovalle Jr. is a 49 y.o. male.    Chief Complaint: Follow-up (bp)  He comes in for routine annual wellness exam with fasting labs and medication refills    Hypertension   This is a chronic problem. The current episode started more than 1 year ago. The problem is unchanged. The problem is controlled. Pertinent negatives include no chest pain, headaches, palpitations or shortness of breath. Risk factors for coronary artery disease include male gender and dyslipidemia. Past treatments include angiotensin blockers. The current treatment provides significant improvement. Compliance problems include diet.    Gastroesophageal Reflux   He reports no abdominal pain, no chest pain, no coughing, no nausea or no sore throat. This is a chronic problem. The current episode started more than 1 year ago. The problem has been unchanged. The symptoms are aggravated by certain foods. Pertinent negatives include no fatigue. He has tried a PPI for the symptoms. The treatment provided significant relief.       Review of Systems   Constitutional: Negative for fatigue, fever and unexpected weight change.   HENT: Negative for ear pain and sore throat.    Eyes: Negative.  Negative for pain and visual disturbance.   Respiratory: Negative for cough and shortness of breath.    Cardiovascular: Negative for chest pain and palpitations.   Gastrointestinal: Negative for abdominal pain, diarrhea, nausea and vomiting.   Genitourinary: Negative for dysuria and frequency.   Musculoskeletal: Negative for arthralgias and myalgias.   Skin: Negative for color change and rash.   Neurological: Negative for dizziness and headaches.   Psychiatric/Behavioral: Negative for sleep disturbance. The patient is not nervous/anxious.        Vitals:    02/06/20 0822   BP: 112/80   Pulse: 70   Temp: 98 °F (36.7 °C)       Objective:     Current Outpatient Medications   Medication Sig Dispense Refill    losartan (COZAAR) 100 MG  tablet TAKE ONE TABLET BY MOUTH DAILY 30 tablet 11    venlafaxine (EFFEXOR-XR) 150 MG Cp24 Take 1 capsule (150 mg total) by mouth once daily. 90 capsule 3    esomeprazole (NEXIUM) 40 MG capsule Take 1 capsule (40 mg total) by mouth once daily. 90 capsule 3     No current facility-administered medications for this visit.        Physical Exam   Constitutional: He is oriented to person, place, and time. He appears well-developed. No distress.   HENT:   Head: Normocephalic and atraumatic.   Eyes: Pupils are equal, round, and reactive to light. EOM are normal.   Neck: Normal range of motion. Neck supple.   Cardiovascular: Normal rate and regular rhythm.   Pulmonary/Chest: Effort normal and breath sounds normal.   Musculoskeletal: Normal range of motion.   Neurological: He is alert and oriented to person, place, and time.   Skin: Skin is warm and dry. No rash noted.   Psychiatric: He has a normal mood and affect. Thought content normal.   Nursing note and vitals reviewed.      Assessment:       1. Annual physical exam    2. Gastroesophageal reflux disease with esophagitis    3. Essential hypertension    4. Screening for prostate cancer        Plan:   Annual physical exam  -     Lipid panel; Future; Expected date: 02/06/2020  -     Comprehensive metabolic panel; Future; Expected date: 02/06/2020  -     PSA, Screening; Future; Expected date: 02/06/2020    Gastroesophageal reflux disease with esophagitis  -     esomeprazole (NEXIUM) 40 MG capsule; Take 1 capsule (40 mg total) by mouth once daily.  Dispense: 90 capsule; Refill: 3    Essential hypertension  -     Lipid panel; Future; Expected date: 02/06/2020  -     Comprehensive metabolic panel; Future; Expected date: 02/06/2020  -     PSA, Screening; Future; Expected date: 02/06/2020    Screening for prostate cancer  -     PSA, Screening; Future; Expected date: 02/06/2020    Other orders  -     venlafaxine (EFFEXOR-XR) 150 MG Cp24; Take 1 capsule (150 mg total) by mouth  once daily.  Dispense: 90 capsule; Refill: 3        No follow-ups on file.    There are no Patient Instructions on file for this visit.

## 2020-02-12 ENCOUNTER — LAB VISIT (OUTPATIENT)
Dept: LAB | Facility: HOSPITAL | Age: 50
End: 2020-02-12
Attending: NURSE PRACTITIONER
Payer: COMMERCIAL

## 2020-02-12 DIAGNOSIS — I10 ESSENTIAL HYPERTENSION: ICD-10-CM

## 2020-02-12 DIAGNOSIS — Z12.5 SCREENING FOR PROSTATE CANCER: ICD-10-CM

## 2020-02-12 DIAGNOSIS — Z00.00 ANNUAL PHYSICAL EXAM: ICD-10-CM

## 2020-02-12 LAB
ALBUMIN SERPL BCP-MCNC: 4.2 G/DL (ref 3.5–5.2)
ALP SERPL-CCNC: 65 U/L (ref 55–135)
ALT SERPL W/O P-5'-P-CCNC: 14 U/L (ref 10–44)
ANION GAP SERPL CALC-SCNC: 8 MMOL/L (ref 8–16)
AST SERPL-CCNC: 15 U/L (ref 10–40)
BILIRUB SERPL-MCNC: 0.4 MG/DL (ref 0.1–1)
BUN SERPL-MCNC: 11 MG/DL (ref 6–20)
CALCIUM SERPL-MCNC: 9.7 MG/DL (ref 8.7–10.5)
CHLORIDE SERPL-SCNC: 102 MMOL/L (ref 95–110)
CHOLEST SERPL-MCNC: 281 MG/DL (ref 120–199)
CHOLEST/HDLC SERPL: 4.2 {RATIO} (ref 2–5)
CO2 SERPL-SCNC: 30 MMOL/L (ref 23–29)
COMPLEXED PSA SERPL-MCNC: 1.1 NG/ML (ref 0–4)
CREAT SERPL-MCNC: 1.2 MG/DL (ref 0.5–1.4)
EST. GFR  (AFRICAN AMERICAN): >60 ML/MIN/1.73 M^2
EST. GFR  (NON AFRICAN AMERICAN): >60 ML/MIN/1.73 M^2
GLUCOSE SERPL-MCNC: 105 MG/DL (ref 70–110)
HDLC SERPL-MCNC: 67 MG/DL (ref 40–75)
HDLC SERPL: 23.8 % (ref 20–50)
LDLC SERPL CALC-MCNC: 176.6 MG/DL (ref 63–159)
NONHDLC SERPL-MCNC: 214 MG/DL
POTASSIUM SERPL-SCNC: 4.4 MMOL/L (ref 3.5–5.1)
PROT SERPL-MCNC: 7.4 G/DL (ref 6–8.4)
SODIUM SERPL-SCNC: 140 MMOL/L (ref 136–145)
TRIGL SERPL-MCNC: 187 MG/DL (ref 30–150)

## 2020-02-12 PROCEDURE — 80061 LIPID PANEL: CPT

## 2020-02-12 PROCEDURE — 36415 COLL VENOUS BLD VENIPUNCTURE: CPT | Mod: PO

## 2020-02-12 PROCEDURE — 80053 COMPREHEN METABOLIC PANEL: CPT

## 2020-02-12 PROCEDURE — 84153 ASSAY OF PSA TOTAL: CPT

## 2020-02-14 DIAGNOSIS — E78.5 HYPERLIPIDEMIA, UNSPECIFIED HYPERLIPIDEMIA TYPE: Primary | ICD-10-CM

## 2020-02-14 RX ORDER — ATORVASTATIN CALCIUM 10 MG/1
10 TABLET, FILM COATED ORAL DAILY
Qty: 30 TABLET | Refills: 11 | Status: SHIPPED | OUTPATIENT
Start: 2020-02-14 | End: 2020-10-26

## 2020-03-20 ENCOUNTER — TELEPHONE (OUTPATIENT)
Dept: FAMILY MEDICINE | Facility: CLINIC | Age: 50
End: 2020-03-20

## 2020-03-20 NOTE — TELEPHONE ENCOUNTER
----- Message from Jerad Wyatt sent at 3/20/2020  1:07 PM CDT -----  Contact: pt   Pt needs to get an order sent to [facility] for cpap mask and headgear. Fax [phone]

## 2020-03-24 ENCOUNTER — TELEPHONE (OUTPATIENT)
Dept: FAMILY MEDICINE | Facility: CLINIC | Age: 50
End: 2020-03-24

## 2020-03-24 NOTE — TELEPHONE ENCOUNTER
----- Message from Samantha Jones sent at 3/24/2020  2:50 PM CDT -----  Contact: pt  Please call pt @ [phone] regarding order for CPak mask, need to know the status, pt states his old mask is not working and he not able to sleep at night due to mask not working properly, and breathing issues at time.

## 2020-10-26 ENCOUNTER — OFFICE VISIT (OUTPATIENT)
Dept: FAMILY MEDICINE | Facility: CLINIC | Age: 50
End: 2020-10-26
Payer: COMMERCIAL

## 2020-10-26 VITALS
HEART RATE: 102 BPM | TEMPERATURE: 98 F | HEIGHT: 69 IN | SYSTOLIC BLOOD PRESSURE: 132 MMHG | WEIGHT: 186 LBS | BODY MASS INDEX: 27.55 KG/M2 | DIASTOLIC BLOOD PRESSURE: 84 MMHG | RESPIRATION RATE: 18 BRPM

## 2020-10-26 DIAGNOSIS — Z11.4 ENCOUNTER FOR SCREENING FOR HIV: ICD-10-CM

## 2020-10-26 DIAGNOSIS — R53.83 FATIGUE, UNSPECIFIED TYPE: ICD-10-CM

## 2020-10-26 DIAGNOSIS — Z86.010 HISTORY OF COLON POLYPS: ICD-10-CM

## 2020-10-26 DIAGNOSIS — K92.1 MELENA: ICD-10-CM

## 2020-10-26 DIAGNOSIS — Z00.00 ANNUAL PHYSICAL EXAM: Primary | ICD-10-CM

## 2020-10-26 DIAGNOSIS — Z12.5 ENCOUNTER FOR PROSTATE CANCER SCREENING: ICD-10-CM

## 2020-10-26 DIAGNOSIS — Z23 IMMUNIZATION DUE: ICD-10-CM

## 2020-10-26 DIAGNOSIS — E29.1 HYPOGONADISM IN MALE: ICD-10-CM

## 2020-10-26 DIAGNOSIS — F41.9 ANXIETY: ICD-10-CM

## 2020-10-26 DIAGNOSIS — Z12.11 COLON CANCER SCREENING: ICD-10-CM

## 2020-10-26 DIAGNOSIS — R07.9 CHEST PAIN, UNSPECIFIED TYPE: ICD-10-CM

## 2020-10-26 DIAGNOSIS — Z11.59 NEED FOR HEPATITIS C SCREENING TEST: ICD-10-CM

## 2020-10-26 DIAGNOSIS — G47.33 OSA (OBSTRUCTIVE SLEEP APNEA): ICD-10-CM

## 2020-10-26 DIAGNOSIS — R10.13 EPIGASTRIC ABDOMINAL PAIN: ICD-10-CM

## 2020-10-26 DIAGNOSIS — E78.5 HYPERLIPIDEMIA, UNSPECIFIED HYPERLIPIDEMIA TYPE: ICD-10-CM

## 2020-10-26 DIAGNOSIS — I10 ESSENTIAL HYPERTENSION: ICD-10-CM

## 2020-10-26 DIAGNOSIS — K21.00 GASTROESOPHAGEAL REFLUX DISEASE WITH ESOPHAGITIS WITHOUT HEMORRHAGE: ICD-10-CM

## 2020-10-26 PROCEDURE — 90471 FLU VACCINE (QUAD) GREATER THAN OR EQUAL TO 3YO PRESERVATIVE FREE IM: ICD-10-PCS | Mod: S$GLB,,, | Performed by: FAMILY MEDICINE

## 2020-10-26 PROCEDURE — 99396 PR PREVENTIVE VISIT,EST,40-64: ICD-10-PCS | Mod: 25,S$GLB,, | Performed by: FAMILY MEDICINE

## 2020-10-26 PROCEDURE — 90472 ZOSTER RECOMBINANT VACCINE: ICD-10-PCS | Mod: S$GLB,,, | Performed by: FAMILY MEDICINE

## 2020-10-26 PROCEDURE — 90686 FLU VACCINE (QUAD) GREATER THAN OR EQUAL TO 3YO PRESERVATIVE FREE IM: ICD-10-PCS | Mod: S$GLB,,, | Performed by: FAMILY MEDICINE

## 2020-10-26 PROCEDURE — 90750 ZOSTER RECOMBINANT VACCINE: ICD-10-PCS | Mod: S$GLB,,, | Performed by: FAMILY MEDICINE

## 2020-10-26 PROCEDURE — 90471 IMMUNIZATION ADMIN: CPT | Mod: S$GLB,,, | Performed by: FAMILY MEDICINE

## 2020-10-26 PROCEDURE — 90750 HZV VACC RECOMBINANT IM: CPT | Mod: S$GLB,,, | Performed by: FAMILY MEDICINE

## 2020-10-26 PROCEDURE — 99396 PREV VISIT EST AGE 40-64: CPT | Mod: 25,S$GLB,, | Performed by: FAMILY MEDICINE

## 2020-10-26 PROCEDURE — 90472 IMMUNIZATION ADMIN EACH ADD: CPT | Mod: S$GLB,,, | Performed by: FAMILY MEDICINE

## 2020-10-26 PROCEDURE — 90686 IIV4 VACC NO PRSV 0.5 ML IM: CPT | Mod: S$GLB,,, | Performed by: FAMILY MEDICINE

## 2020-10-26 PROCEDURE — 99999 PR PBB SHADOW E&M-EST. PATIENT-LVL IV: ICD-10-PCS | Mod: PBBFAC,,, | Performed by: FAMILY MEDICINE

## 2020-10-26 PROCEDURE — 99999 PR PBB SHADOW E&M-EST. PATIENT-LVL IV: CPT | Mod: PBBFAC,,, | Performed by: FAMILY MEDICINE

## 2020-10-26 RX ORDER — VENLAFAXINE HYDROCHLORIDE 150 MG/1
150 CAPSULE, EXTENDED RELEASE ORAL DAILY
Qty: 90 CAPSULE | Refills: 3 | Status: SHIPPED | OUTPATIENT
Start: 2020-10-26 | End: 2021-09-07

## 2020-10-26 RX ORDER — LOSARTAN POTASSIUM 100 MG/1
100 TABLET ORAL DAILY
Qty: 90 TABLET | Refills: 3 | Status: SHIPPED | OUTPATIENT
Start: 2020-10-26 | End: 2021-09-07

## 2020-10-26 RX ORDER — DEXLANSOPRAZOLE 60 MG/1
1 CAPSULE, DELAYED RELEASE ORAL DAILY
Qty: 90 CAPSULE | Refills: 3 | Status: SHIPPED | OUTPATIENT
Start: 2020-10-26 | End: 2022-04-05 | Stop reason: ALTCHOICE

## 2020-10-26 RX ORDER — SODIUM, POTASSIUM,MAG SULFATES 17.5-3.13G
SOLUTION, RECONSTITUTED, ORAL ORAL
Qty: 354 ML | Refills: 0 | Status: SHIPPED | OUTPATIENT
Start: 2020-10-26 | End: 2022-10-19

## 2020-10-26 NOTE — PROGRESS NOTES
Subjective:      Patient ID: Nj Ovalle Jr. is a 49 y.o. male.    Chief Complaint: Annual Exam    Problem List Items Addressed This Visit     Anxiety    Overview     He has chronic anxiety and he has been on effexor for a long time successfully and he will continue this.          Essential hypertension    Overview     The patient presents with essential hypertension.  The patient is tolerating the medication well and is in excellent compliance.  The patient is experiencing no side effects.  Counseling was offered regarding low salt diets.  The patient has a reduced salt intake.  The patient denies chest pain, palpitations, shortness of breath, dyspnea on exertion, left or murmur neck pain, nausea, vomiting, diaphoresis, paroxysmal nocturnal dyspnea, and orthopnea.   Hypertension Medications             losartan (COZAAR) 100 MG tablet TAKE ONE TABLET BY MOUTH DAILY                     Gastroesophageal reflux disease with esophagitis    Overview     The patient presents with GERD.  Denies chest pain, nausea & vomiting, belching, cramping, distention, dyspepsia, dysphagia, hematochezia, melena, abdominal pain and weight loss.  Current treatment has included medications that are listed in medications list with significant response.  There has been no medicine intolerance.  The patient cannot identify any exacerbating factors.  Avoidance of NSAID's, ASA, carbonated beverages and spicy food was discussed.           Hyperlipidemia    Overview     He has hyperlipidemia but is not on medicine.   Lab Results   Component Value Date    CHOL 281 (H) 02/12/2020    CHOL 279 (H) 05/24/2019    CHOL 240 (H) 09/07/2016     Lab Results   Component Value Date    HDL 67 02/12/2020    HDL 94 (H) 05/24/2019    HDL 64 09/07/2016     Lab Results   Component Value Date    LDLCALC 176.6 (H) 02/12/2020    LDLCALC 160.4 (H) 05/24/2019    LDLCALC 159.2 (H) 09/07/2016     Lab Results   Component Value Date    TRIG 187 (H) 02/12/2020     TRIG 123 05/24/2019    TRIG 84 09/07/2016     Lab Results   Component Value Date    CHOLHDL 23.8 02/12/2020    CHOLHDL 33.7 05/24/2019    CHOLHDL 26.7 09/07/2016              Epigastric abdominal pain    Melena    LALA (obstructive sleep apnea)    Overview     He has had lala for a long time and he has been on a cpap since around 2014.  He has fatigue and he awakens tired. He has not had his settings changed in a long time. He wears it nightly.           Hypogonadism in male    Overview     The patient has hypogonadism.  He had Treatment in the past but has not been on it for the last 2 years.    Labs are tracked.    Lab Results   Component Value Date    WBC 6.41 04/03/2017    HGB 13.8 (L) 04/03/2017    HCT 43.0 04/03/2017    MCV 90 04/03/2017     04/03/2017     PSA, SCREEN (ng/mL)   Date Value   02/12/2020 1.1     Lab Results   Component Value Date    TOTALTESTOST 366 09/20/2017              Chest pain    Overview     He has had episodes of CP 4 x over the last 10 years.  His last was about 1 year ago. He has two cousins that had CAD with bypass in their 40's and he has concerns.         Fatigue      Other Visit Diagnoses     Annual physical exam    -  Primary    Colon cancer screening        History of colon polyps        Need for hepatitis C screening test        Encounter for screening for HIV        Immunization due        Encounter for prostate cancer screening              Past Medical History:  Past Medical History:   Diagnosis Date    Anxiety     Essential hypertension 9/7/2016    GERD (gastroesophageal reflux disease)     HTN (hypertension)     LALA (obstructive sleep apnea) 5/31/2017     Past Surgical History:   Procedure Laterality Date    VASECTOMY       Review of patient's allergies indicates:   Allergen Reactions    Bupropion hcl Other (See Comments)    Sertraline Nausea And Vomiting and Other (See Comments)    Benadryl [diphenhydramine hcl] Anxiety     Causes to shake     Current  Outpatient Medications on File Prior to Visit   Medication Sig Dispense Refill    DEXILANT 60 mg capsule TAKE ONE CAPSULE BY MOUTH DAILY 90 capsule 0    losartan (COZAAR) 100 MG tablet TAKE ONE TABLET BY MOUTH DAILY 30 tablet 11    venlafaxine (EFFEXOR-XR) 150 MG Cp24 Take 1 capsule (150 mg total) by mouth once daily. 90 capsule 3    [DISCONTINUED] atorvastatin (LIPITOR) 10 MG tablet Take 1 tablet (10 mg total) by mouth once daily. 30 tablet 11    [DISCONTINUED] esomeprazole (NEXIUM) 40 MG capsule Take 1 capsule (40 mg total) by mouth once daily. (Patient not taking: Reported on 10/26/2020) 90 capsule 3     No current facility-administered medications on file prior to visit.      Social History     Socioeconomic History    Marital status:      Spouse name: Not on file    Number of children: Not on file    Years of education: Not on file    Highest education level: Not on file   Occupational History    Not on file   Social Needs    Financial resource strain: Not on file    Food insecurity     Worry: Not on file     Inability: Not on file    Transportation needs     Medical: Not on file     Non-medical: Not on file   Tobacco Use    Smoking status: Current Every Day Smoker     Packs/day: 0.25     Types: Cigarettes    Smokeless tobacco: Current User     Types: Snuff   Substance and Sexual Activity    Alcohol use: Yes     Alcohol/week: 5.8 standard drinks     Types: 7 Standard drinks or equivalent per week     Frequency: 4 or more times a week     Drinks per session: 1 or 2     Binge frequency: Daily or almost daily    Drug use: No    Sexual activity: Yes     Partners: Female   Lifestyle    Physical activity     Days per week: Not on file     Minutes per session: Not on file    Stress: Not on file   Relationships    Social connections     Talks on phone: Not on file     Gets together: Not on file     Attends Mandaen service: Not on file     Active member of club or organization: Not on file  "    Attends meetings of clubs or organizations: Not on file     Relationship status: Not on file   Other Topics Concern    Not on file   Social History Narrative    Not on file     Family History   Problem Relation Age of Onset    Hypertension Mother     Cancer Paternal Uncle         thyroid cancer    Cancer Paternal Grandmother        Review of Systems   Constitutional: Positive for fatigue. Negative for chills, diaphoresis and fever.   HENT: Negative for congestion, hearing loss, mouth sores, postnasal drip and sore throat.    Eyes: Negative for pain and visual disturbance.   Respiratory: Positive for chest tightness. Negative for cough, shortness of breath and wheezing.    Cardiovascular: Negative for palpitations and leg swelling.   Gastrointestinal: Negative for abdominal pain, anal bleeding, blood in stool, constipation, diarrhea, nausea and vomiting.   Genitourinary: Negative for dysuria and hematuria.   Musculoskeletal: Negative for back pain, neck pain and neck stiffness.   Skin: Negative for rash.   Neurological: Negative for dizziness and weakness.       Objective:     /84 (BP Location: Right arm, Patient Position: Sitting, BP Method: Medium (Automatic))   Pulse 102   Temp 97.9 °F (36.6 °C)   Resp 18   Ht 5' 9" (1.753 m)   Wt 84.4 kg (186 lb)   BMI 27.47 kg/m²     Physical Exam  Constitutional:       Appearance: He is well-developed. He is not diaphoretic.   HENT:      Head: Normocephalic and atraumatic.      Right Ear: External ear normal.      Left Ear: External ear normal.      Nose: Nose normal.      Mouth/Throat:      Pharynx: No oropharyngeal exudate.   Eyes:      General: No scleral icterus.        Right eye: No discharge.         Left eye: No discharge.      Conjunctiva/sclera: Conjunctivae normal.      Pupils: Pupils are equal, round, and reactive to light.   Neck:      Musculoskeletal: Normal range of motion and neck supple.      Thyroid: No thyromegaly.      Vascular: No JVD. "   Cardiovascular:      Rate and Rhythm: Normal rate and regular rhythm.      Heart sounds: Normal heart sounds. No murmur. No friction rub. No gallop.    Pulmonary:      Effort: Pulmonary effort is normal. No respiratory distress.      Breath sounds: Normal breath sounds. No wheezing or rales.   Chest:      Chest wall: No tenderness.   Abdominal:      General: Bowel sounds are normal. There is no distension.      Palpations: Abdomen is soft. There is no mass.      Tenderness: There is no abdominal tenderness. There is no guarding or rebound.   Musculoskeletal: Normal range of motion.         General: No tenderness.   Lymphadenopathy:      Cervical: No cervical adenopathy.   Skin:     General: Skin is warm and dry.   Neurological:      Mental Status: He is alert and oriented to person, place, and time.      Cranial Nerves: No cranial nerve deficit.      Coordination: Coordination normal.       Assessment:     1. Annual physical exam    2. Colon cancer screening    3. Hypogonadism in male    4. History of colon polyps    5. Need for hepatitis C screening test    6. Encounter for screening for HIV    7. Immunization due    8. Encounter for prostate cancer screening    9. Fatigue, unspecified type    10. Chest pain, unspecified type    11. LALA (obstructive sleep apnea)    12. Hyperlipidemia, unspecified hyperlipidemia type    13. Essential hypertension    14. Gastroesophageal reflux disease with esophagitis    15. Melena    16. Epigastric abdominal pain    17. Anxiety        Plan:     Problem List Items Addressed This Visit     Anxiety    Relevant Medications    venlafaxine (EFFEXOR-XR) 150 MG Cp24    Essential hypertension    Relevant Medications    losartan (COZAAR) 100 MG tablet    Gastroesophageal reflux disease with esophagitis    Relevant Medications    dexlansoprazole (DEXILANT) 60 mg capsule    Hyperlipidemia    Epigastric abdominal pain    Relevant Medications    dexlansoprazole (DEXILANT) 60 mg capsule     Melena    Relevant Medications    dexlansoprazole (DEXILANT) 60 mg capsule    LALA (obstructive sleep apnea)    Relevant Orders    Ambulatory referral/consult to Sleep Disorders    Hypogonadism in male    Chest pain    Relevant Orders    Stress Echo Which stress agent will be used? Treadmill Exercise; Color Flow Doppler? No    Fatigue    Relevant Orders    Ambulatory referral/consult to Sleep Disorders      Other Visit Diagnoses     Annual physical exam    -  Primary    Relevant Medications    sodium,potassium,mag sulfates (SUPREP BOWEL PREP KIT) 17.5-3.13-1.6 gram SolR    Other Relevant Orders    CBC auto differential    Comprehensive Metabolic Panel    Testosterone    TSH    Lipid Panel    Urinalysis    Case request GI: COLONOSCOPY (Completed)    COVID-19 Routine Screening    Colon cancer screening        Relevant Medications    sodium,potassium,mag sulfates (SUPREP BOWEL PREP KIT) 17.5-3.13-1.6 gram SolR    Other Relevant Orders    Case request GI: COLONOSCOPY (Completed)    COVID-19 Routine Screening    History of colon polyps        Need for hepatitis C screening test        Relevant Orders    Hepatitis C Antibody    Encounter for screening for HIV        Relevant Orders    HIV 1/2 Ag/Ab (4th Gen)    Immunization due        Relevant Orders    Zoster Recombinant Vaccine    Influenza - Quadrivalent (PF)    Encounter for prostate cancer screening        Relevant Orders    PSA, Screening        No follow-ups on file.      I have discontinued Tyson Ovalle Jr.'s esomeprazole and atorvastatin. I am also having him maintain his losartan, venlafaxine, and DEXILANT.    Nj was seen today for annual exam.    Diagnoses and all orders for this visit:    Annual physical exam  -     CBC auto differential; Future  -     Comprehensive Metabolic Panel; Future  -     Testosterone; Future  -     TSH; Future  -     Lipid Panel; Future  -     Urinalysis; Future  -     Case request GI: COLONOSCOPY  -     COVID-19 Routine  Screening; Future  -     sodium,potassium,mag sulfates (SUPREP BOWEL PREP KIT) 17.5-3.13-1.6 gram SolR; Take as instructed on prep sheet    Colon cancer screening  -     Case request GI: COLONOSCOPY  -     COVID-19 Routine Screening; Future  -     sodium,potassium,mag sulfates (SUPREP BOWEL PREP KIT) 17.5-3.13-1.6 gram SolR; Take as instructed on prep sheet    Hypogonadism in male    History of colon polyps    Need for hepatitis C screening test  -     Hepatitis C Antibody; Future    Encounter for screening for HIV  -     HIV 1/2 Ag/Ab (4th Gen); Future    Immunization due  -     Zoster Recombinant Vaccine  -     Influenza - Quadrivalent (PF); Future    Encounter for prostate cancer screening  -     PSA, Screening; Future    Fatigue, unspecified type  -     Ambulatory referral/consult to Sleep Disorders; Future    Chest pain, unspecified type  -     Stress Echo Which stress agent will be used? Treadmill Exercise; Color Flow Doppler? No; Future    LALA (obstructive sleep apnea)  -     Ambulatory referral/consult to Sleep Disorders; Future    Hyperlipidemia, unspecified hyperlipidemia type    Essential hypertension  -     losartan (COZAAR) 100 MG tablet; Take 1 tablet (100 mg total) by mouth once daily.    Gastroesophageal reflux disease with esophagitis  -     dexlansoprazole (DEXILANT) 60 mg capsule; Take 1 capsule (60 mg total) by mouth once daily.    Melena  -     dexlansoprazole (DEXILANT) 60 mg capsule; Take 1 capsule (60 mg total) by mouth once daily.    Epigastric abdominal pain  -     dexlansoprazole (DEXILANT) 60 mg capsule; Take 1 capsule (60 mg total) by mouth once daily.    Anxiety  -     venlafaxine (EFFEXOR-XR) 150 MG Cp24; Take 1 capsule (150 mg total) by mouth once daily.         The patient was instructed to stop the following meds:  Medications Discontinued During This Encounter   Medication Reason    esomeprazole (NEXIUM) 40 MG capsule Alternate therapy    atorvastatin (LIPITOR) 10 MG tablet      losartan (COZAAR) 100 MG tablet Reorder    venlafaxine (EFFEXOR-XR) 150 MG Cp24 Reorder    DEXILANT 60 mg capsule Reorder     Orders Placed This Encounter   Procedures    Zoster Recombinant Vaccine    Influenza - Quadrivalent (PF)     Standing Status:   Future     Standing Expiration Date:   10/26/2021    CBC auto differential     Standing Status:   Future     Standing Expiration Date:   12/25/2021    Comprehensive Metabolic Panel     Standing Status:   Future     Standing Expiration Date:   10/26/2021    Testosterone     Standing Status:   Future     Standing Expiration Date:   10/26/2021    TSH     Standing Status:   Future     Standing Expiration Date:   10/26/2021    PSA, Screening     Standing Status:   Future     Standing Expiration Date:   10/27/2021    Lipid Panel     Standing Status:   Future     Standing Expiration Date:   10/26/2021    Urinalysis     Standing Status:   Future     Standing Expiration Date:   10/26/2021    HIV 1/2 Ag/Ab (4th Gen)     Standing Status:   Future     Standing Expiration Date:   12/26/2021    Hepatitis C Antibody     Standing Status:   Future     Standing Expiration Date:   10/26/2021    COVID-19 Routine Screening     Standing Status:   Future     Standing Expiration Date:   12/25/2021     Order Specific Question:   Is the patient symptomatic?     Answer:   No     Order Specific Question:   Is this needed for pre-procedure or pre-op testing?     Answer:   Yes     Order Specific Question:   Diagnosis:     Answer:   Preop examination [288462]    Ambulatory referral/consult to Sleep Disorders     Standing Status:   Future     Standing Expiration Date:   11/26/2021     Referral Priority:   Routine     Referral Type:   Consultation     Requested Specialty:   Sleep Medicine     Number of Visits Requested:   1    Stress Echo Which stress agent will be used? Treadmill Exercise; Color Flow Doppler? No     Standing Status:   Future     Standing Expiration Date:    10/26/2021     Order Specific Question:   Which stress agent will be used?     Answer:   Treadmill Exercise     Order Specific Question:   Color Flow Doppler?     Answer:   No    Case request GI: COLONOSCOPY     Order Specific Question:   Pre-op Diagnosis     Answer:   Colon cancer screening [107005]     Order Specific Question:   CPT Code:     Answer:   PA COLORECTAL CANCER SCREEN RESULTS DOCUMENT/REVIEW [3017F]     Order Specific Question:   Case Referring Provider     Answer:   SHELIA BOYD [[id number]]     Order Specific Question:   CPT Code:     Answer:   PA COLON CA SCRN NOT HI RSK IND []     Order Specific Question:   Medical Necessity:     Answer:   Medically Non-Urgent [100]     Order Specific Question:   Is an on-site pathologist required for this procedure?     Answer:   N/A

## 2020-10-27 ENCOUNTER — LAB VISIT (OUTPATIENT)
Dept: LAB | Facility: HOSPITAL | Age: 50
End: 2020-10-27
Attending: FAMILY MEDICINE
Payer: COMMERCIAL

## 2020-10-27 DIAGNOSIS — Z12.5 ENCOUNTER FOR PROSTATE CANCER SCREENING: ICD-10-CM

## 2020-10-27 DIAGNOSIS — Z00.00 ANNUAL PHYSICAL EXAM: ICD-10-CM

## 2020-10-27 DIAGNOSIS — Z11.4 ENCOUNTER FOR SCREENING FOR HIV: ICD-10-CM

## 2020-10-27 DIAGNOSIS — Z11.59 NEED FOR HEPATITIS C SCREENING TEST: ICD-10-CM

## 2020-10-27 LAB
ALBUMIN SERPL BCP-MCNC: 4.1 G/DL (ref 3.5–5.2)
ALP SERPL-CCNC: 75 U/L (ref 55–135)
ALT SERPL W/O P-5'-P-CCNC: 19 U/L (ref 10–44)
ANION GAP SERPL CALC-SCNC: 9 MMOL/L (ref 8–16)
AST SERPL-CCNC: 20 U/L (ref 10–40)
BASOPHILS # BLD AUTO: 0.06 K/UL (ref 0–0.2)
BASOPHILS NFR BLD: 0.6 % (ref 0–1.9)
BILIRUB SERPL-MCNC: 0.5 MG/DL (ref 0.1–1)
BUN SERPL-MCNC: 14 MG/DL (ref 6–20)
CALCIUM SERPL-MCNC: 10 MG/DL (ref 8.7–10.5)
CHLORIDE SERPL-SCNC: 103 MMOL/L (ref 95–110)
CHOLEST SERPL-MCNC: 267 MG/DL (ref 120–199)
CHOLEST/HDLC SERPL: 4.3 {RATIO} (ref 2–5)
CO2 SERPL-SCNC: 26 MMOL/L (ref 23–29)
CREAT SERPL-MCNC: 1.1 MG/DL (ref 0.5–1.4)
DIFFERENTIAL METHOD: ABNORMAL
EOSINOPHIL # BLD AUTO: 0.3 K/UL (ref 0–0.5)
EOSINOPHIL NFR BLD: 2.7 % (ref 0–8)
ERYTHROCYTE [DISTWIDTH] IN BLOOD BY AUTOMATED COUNT: 15 % (ref 11.5–14.5)
EST. GFR  (AFRICAN AMERICAN): >60 ML/MIN/1.73 M^2
EST. GFR  (NON AFRICAN AMERICAN): >60 ML/MIN/1.73 M^2
GLUCOSE SERPL-MCNC: 93 MG/DL (ref 70–110)
HCT VFR BLD AUTO: 45.1 % (ref 40–54)
HDLC SERPL-MCNC: 62 MG/DL (ref 40–75)
HDLC SERPL: 23.2 % (ref 20–50)
HGB BLD-MCNC: 14 G/DL (ref 14–18)
IMM GRANULOCYTES # BLD AUTO: 0.09 K/UL (ref 0–0.04)
IMM GRANULOCYTES NFR BLD AUTO: 0.9 % (ref 0–0.5)
LDLC SERPL CALC-MCNC: 172.4 MG/DL (ref 63–159)
LYMPHOCYTES # BLD AUTO: 1.2 K/UL (ref 1–4.8)
LYMPHOCYTES NFR BLD: 12.3 % (ref 18–48)
MCH RBC QN AUTO: 29.3 PG (ref 27–31)
MCHC RBC AUTO-ENTMCNC: 31 G/DL (ref 32–36)
MCV RBC AUTO: 94 FL (ref 82–98)
MONOCYTES # BLD AUTO: 0.8 K/UL (ref 0.3–1)
MONOCYTES NFR BLD: 8.1 % (ref 4–15)
NEUTROPHILS # BLD AUTO: 7.1 K/UL (ref 1.8–7.7)
NEUTROPHILS NFR BLD: 75.4 % (ref 38–73)
NONHDLC SERPL-MCNC: 205 MG/DL
NRBC BLD-RTO: 0 /100 WBC
PLATELET # BLD AUTO: 210 K/UL (ref 150–350)
PMV BLD AUTO: 11.6 FL (ref 9.2–12.9)
POTASSIUM SERPL-SCNC: 4.5 MMOL/L (ref 3.5–5.1)
PROT SERPL-MCNC: 7.3 G/DL (ref 6–8.4)
RBC # BLD AUTO: 4.78 M/UL (ref 4.6–6.2)
SODIUM SERPL-SCNC: 138 MMOL/L (ref 136–145)
TRIGL SERPL-MCNC: 163 MG/DL (ref 30–150)
TSH SERPL DL<=0.005 MIU/L-ACNC: 1.58 UIU/ML (ref 0.4–4)
WBC # BLD AUTO: 9.49 K/UL (ref 3.9–12.7)

## 2020-10-27 PROCEDURE — 86803 HEPATITIS C AB TEST: CPT

## 2020-10-27 PROCEDURE — 36415 COLL VENOUS BLD VENIPUNCTURE: CPT | Mod: PO

## 2020-10-27 PROCEDURE — 80061 LIPID PANEL: CPT

## 2020-10-27 PROCEDURE — 85025 COMPLETE CBC W/AUTO DIFF WBC: CPT

## 2020-10-27 PROCEDURE — 84403 ASSAY OF TOTAL TESTOSTERONE: CPT

## 2020-10-27 PROCEDURE — 86703 HIV-1/HIV-2 1 RESULT ANTBDY: CPT

## 2020-10-27 PROCEDURE — 84153 ASSAY OF PSA TOTAL: CPT

## 2020-10-27 PROCEDURE — 80053 COMPREHEN METABOLIC PANEL: CPT

## 2020-10-27 PROCEDURE — 84443 ASSAY THYROID STIM HORMONE: CPT

## 2020-10-28 LAB
COMPLEXED PSA SERPL-MCNC: 1.2 NG/ML (ref 0–4)
HCV AB SERPL QL IA: NEGATIVE
HIV 1+2 AB+HIV1 P24 AG SERPL QL IA: NEGATIVE
TESTOST SERPL-MCNC: 302 NG/DL (ref 304–1227)

## 2020-10-29 NOTE — PROGRESS NOTES
I have reviewed the HIV test and it appears to be negative.  This is a once a lifetime screen so this does not need to be done unless if symptoms warrant.    I have reviewed the Hepatitis C Screening Test.  The findings indicate that at this time it is negative and because of that, no other testing is indicated.    The testosterone is a little low at this time and based on that, treatment may be indicated if the fatigue is bothersome enough at this time.  Please let me know.      The current value for the PSA is considered normal.  Please recheck this in 1 year.     The TSH level,  which is a measure of the thyroid function, is normal.   No further workup on the thyroid is needed.   I have reviewed the CMP which is a comprehensive metabolic panel of all electrolytes including a look at the liver, kidney and to look at your sugar level.  All of the numbers appear acceptable.  Please consider rechecking this in one year at the time of the annual physical if it is still indicated.  The cholesterol is very high at this time but the LDL is really unchanged.   The 10 year risk of a heart attack is currently at 11.8%.  We usually treat this to get it below 10% if possible.  Because of this, I recommend starting Lipitor 40 mg once a day.  Repeat a lipid panel in three months.  The 10-year ASCVD risk score (Dereckangi GRANT Jr., et al., 2013) is: 11.8%    Values used to calculate the score:      Age: 49 years      Sex: Male      Is Non- : No      Diabetic: No      Tobacco smoker: Yes      Systolic Blood Pressure: 132 mmHg      Is BP treated: Yes      HDL Cholesterol: 62 mg/dL      Total Cholesterol: 267 mg/dL

## 2020-11-06 ENCOUNTER — TELEPHONE (OUTPATIENT)
Dept: FAMILY MEDICINE | Facility: CLINIC | Age: 50
End: 2020-11-06

## 2020-11-06 NOTE — TELEPHONE ENCOUNTER
----- Message from Veronica De La Rosa sent at 11/6/2020  2:04 PM CST -----  Type:  Patient Returning Call    Who Called:Pt  Who Left Message for Patient:Nurse  Does the patient know what this is regarding?: results   Would the patient rather a call back or a response via [facility]? Call   Best Call Back Number:[phone] (home)   Additional Information:

## 2020-11-10 RX ORDER — ATORVASTATIN CALCIUM 40 MG/1
40 TABLET, FILM COATED ORAL DAILY
Qty: 90 TABLET | Refills: 3 | Status: SHIPPED | OUTPATIENT
Start: 2020-11-10 | End: 2022-10-19

## 2020-11-10 RX ORDER — ATORVASTATIN CALCIUM 40 MG/1
40 TABLET, FILM COATED ORAL DAILY
COMMUNITY
End: 2020-11-10 | Stop reason: SDUPTHER

## 2020-11-10 NOTE — TELEPHONE ENCOUNTER
----- Message from Gomez Ta MD sent at 10/29/2020  5:55 PM CDT -----  I have reviewed the HIV test and it appears to be negative.  This is a once a lifetime screen so this does not need to be done unless if symptoms warrant.    I have reviewed the Hepatitis C Screening Test.  The findings indicate that at this time it is negative and because of that, no other testing is indicated.    The testosterone is a little low at this time and based on that, treatment may be indicated if the fatigue is bothersome enough at this time.  Please let me know.      The current value for the PSA is considered normal.  Please recheck this in 1 year.     The TSH level,  which is a measure of the thyroid function, is normal.   No further workup on the thyroid is needed.   I have reviewed the CMP which is a comprehensive metabolic panel of all electrolytes including a look at the liver, kidney and to look at your sugar level.  All of the numbers appear acceptable.  Please consider rechecking this in one year at the time of the annual physical if it is still indicated.  The cholesterol is very high at this time but the LDL is really unchanged.   The 10 year risk of a heart attack is currently at 11.8%.  We usually treat this to get it below 10% if possible.  Because of this, I recommend starting Lipitor 40 mg once a day.  Repeat a lipid panel in three months.  The 10-year ASCVD risk score (Dereck GRANT Jr., et al., 2013) is: 11.8%    Values used to calculate the score:      Age: 49 years      Sex: Male      Is Non- : No      Diabetic: No      Tobacco smoker: Yes      Systolic Blood Pressure: 132 mmHg      Is BP treated: Yes      HDL Cholesterol: 62 mg/dL      Total Cholesterol: 267 mg/dL

## 2020-11-23 ENCOUNTER — TELEPHONE (OUTPATIENT)
Dept: CARDIOLOGY | Facility: HOSPITAL | Age: 50
End: 2020-11-23

## 2020-11-23 NOTE — TELEPHONE ENCOUNTER
The patient was contacted and notified his stress echo had to be rescheduled due to Physician not available. He stated he would call back to reschedule after checking his schedule.

## 2020-12-17 ENCOUNTER — TELEPHONE (OUTPATIENT)
Dept: FAMILY MEDICINE | Facility: CLINIC | Age: 50
End: 2020-12-17

## 2020-12-17 DIAGNOSIS — Z23 IMMUNIZATION DUE: Primary | ICD-10-CM

## 2020-12-17 NOTE — TELEPHONE ENCOUNTER
I have signed for the following orders AND/OR meds.  Please call the patient and ask the patient to schedule the testing AND/OR inform about any medications that were sent.      Orders Placed This Encounter   Procedures    (In Office Administered) Zoster Recombinant Vaccine     Standing Status:   Future     Standing Expiration Date:   3/17/2021

## 2021-02-08 ENCOUNTER — OFFICE VISIT (OUTPATIENT)
Dept: FAMILY MEDICINE | Facility: CLINIC | Age: 51
End: 2021-02-08
Payer: COMMERCIAL

## 2021-02-08 ENCOUNTER — TELEPHONE (OUTPATIENT)
Dept: PULMONOLOGY | Facility: CLINIC | Age: 51
End: 2021-02-08

## 2021-02-08 DIAGNOSIS — R05.9 COUGH: ICD-10-CM

## 2021-02-08 DIAGNOSIS — J32.9 SINUSITIS, UNSPECIFIED CHRONICITY, UNSPECIFIED LOCATION: Primary | ICD-10-CM

## 2021-02-08 DIAGNOSIS — R51.9 HEAD ACHE: ICD-10-CM

## 2021-02-08 PROCEDURE — 99213 OFFICE O/P EST LOW 20 MIN: CPT | Mod: 95,,, | Performed by: NURSE PRACTITIONER

## 2021-02-08 PROCEDURE — 99213 PR OFFICE/OUTPT VISIT, EST, LEVL III, 20-29 MIN: ICD-10-PCS | Mod: 95,,, | Performed by: NURSE PRACTITIONER

## 2021-02-08 RX ORDER — FLUTICASONE PROPIONATE 50 MCG
1 SPRAY, SUSPENSION (ML) NASAL DAILY
Qty: 18 G | Refills: 0 | Status: SHIPPED | OUTPATIENT
Start: 2021-02-08 | End: 2022-10-19

## 2021-02-08 RX ORDER — AZITHROMYCIN 250 MG/1
TABLET, FILM COATED ORAL
Qty: 6 TABLET | Refills: 0 | Status: SHIPPED | OUTPATIENT
Start: 2021-02-08 | End: 2021-02-13

## 2021-02-19 ENCOUNTER — TELEPHONE (OUTPATIENT)
Dept: PULMONOLOGY | Facility: CLINIC | Age: 51
End: 2021-02-19

## 2021-02-25 ENCOUNTER — OFFICE VISIT (OUTPATIENT)
Dept: FAMILY MEDICINE | Facility: CLINIC | Age: 51
End: 2021-02-25
Payer: COMMERCIAL

## 2021-02-25 ENCOUNTER — TELEPHONE (OUTPATIENT)
Dept: FAMILY MEDICINE | Facility: CLINIC | Age: 51
End: 2021-02-25

## 2021-02-25 VITALS
TEMPERATURE: 99 F | BODY MASS INDEX: 27.85 KG/M2 | DIASTOLIC BLOOD PRESSURE: 88 MMHG | SYSTOLIC BLOOD PRESSURE: 131 MMHG | WEIGHT: 188 LBS | HEART RATE: 93 BPM | HEIGHT: 69 IN

## 2021-02-25 DIAGNOSIS — R51.9 HEAD ACHE: ICD-10-CM

## 2021-02-25 DIAGNOSIS — J32.9 SINUSITIS, UNSPECIFIED CHRONICITY, UNSPECIFIED LOCATION: Primary | ICD-10-CM

## 2021-02-25 PROCEDURE — 96372 PR INJECTION,THERAP/PROPH/DIAG2ST, IM OR SUBCUT: ICD-10-PCS | Mod: S$GLB,,, | Performed by: NURSE PRACTITIONER

## 2021-02-25 PROCEDURE — 99213 OFFICE O/P EST LOW 20 MIN: CPT | Mod: 25,S$GLB,, | Performed by: NURSE PRACTITIONER

## 2021-02-25 PROCEDURE — 99999 PR PBB SHADOW E&M-EST. PATIENT-LVL IV: ICD-10-PCS | Mod: PBBFAC,,, | Performed by: NURSE PRACTITIONER

## 2021-02-25 PROCEDURE — 96372 THER/PROPH/DIAG INJ SC/IM: CPT | Mod: S$GLB,,, | Performed by: NURSE PRACTITIONER

## 2021-02-25 PROCEDURE — 99213 PR OFFICE/OUTPT VISIT, EST, LEVL III, 20-29 MIN: ICD-10-PCS | Mod: 25,S$GLB,, | Performed by: NURSE PRACTITIONER

## 2021-02-25 PROCEDURE — 99999 PR PBB SHADOW E&M-EST. PATIENT-LVL IV: CPT | Mod: PBBFAC,,, | Performed by: NURSE PRACTITIONER

## 2021-02-25 RX ORDER — DEXAMETHASONE SODIUM PHOSPHATE 4 MG/ML
8 INJECTION, SOLUTION INTRA-ARTICULAR; INTRALESIONAL; INTRAMUSCULAR; INTRAVENOUS; SOFT TISSUE ONCE
Status: COMPLETED | OUTPATIENT
Start: 2021-02-25 | End: 2021-02-25

## 2021-02-25 RX ORDER — MONTELUKAST SODIUM 10 MG/1
10 TABLET ORAL NIGHTLY
Qty: 30 TABLET | Refills: 0 | Status: SHIPPED | OUTPATIENT
Start: 2021-02-25 | End: 2021-03-04

## 2021-02-25 RX ADMIN — DEXAMETHASONE SODIUM PHOSPHATE 8 MG: 4 INJECTION, SOLUTION INTRA-ARTICULAR; INTRALESIONAL; INTRAMUSCULAR; INTRAVENOUS; SOFT TISSUE at 11:02

## 2021-04-19 ENCOUNTER — TELEPHONE (OUTPATIENT)
Dept: ENDOSCOPY | Facility: HOSPITAL | Age: 51
End: 2021-04-19

## 2021-04-27 ENCOUNTER — TELEPHONE (OUTPATIENT)
Dept: ENDOSCOPY | Facility: HOSPITAL | Age: 51
End: 2021-04-27

## 2021-12-09 ENCOUNTER — PATIENT OUTREACH (OUTPATIENT)
Dept: ADMINISTRATIVE | Facility: HOSPITAL | Age: 51
End: 2021-12-09
Payer: COMMERCIAL

## 2022-02-10 DIAGNOSIS — I10 ESSENTIAL HYPERTENSION: ICD-10-CM

## 2022-04-04 ENCOUNTER — PATIENT OUTREACH (OUTPATIENT)
Dept: ADMINISTRATIVE | Facility: HOSPITAL | Age: 52
End: 2022-04-04
Payer: COMMERCIAL

## 2022-04-04 NOTE — PROGRESS NOTES
Working HTN Report: Call patient to obtain home BP reading or schedule Primary Care Visit, Pt is driving & says my new insurance won't kick in until 30 days, Pt accepts appt for 05/18/22.

## 2022-04-05 DIAGNOSIS — K21.00 GASTROESOPHAGEAL REFLUX DISEASE WITH ESOPHAGITIS WITHOUT HEMORRHAGE: Primary | ICD-10-CM

## 2022-04-05 RX ORDER — ESOMEPRAZOLE MAGNESIUM 40 MG/1
40 CAPSULE, DELAYED RELEASE ORAL
Qty: 30 CAPSULE | Refills: 0 | Status: SHIPPED | OUTPATIENT
Start: 2022-04-05 | End: 2022-05-13

## 2022-04-05 NOTE — TELEPHONE ENCOUNTER
No new care gaps identified.  Powered by [employer]. Reference number: [id number].   4/05/2022 11:47:40 AM CDT

## 2022-04-05 NOTE — TELEPHONE ENCOUNTER
----- Message from Jessica De La Rosa sent at 4/5/2022 11:42 AM CDT -----  Contact: Patient, [phone]  Requesting an RX refill or new RX.  Is this a refill or new RX: Refill  RX name and strength (copy/paste from chart):  esomeprazole (NEXIUM) 40 MG capsule   Is this a 30 day or 90 day RX: 90  Pharmacy name and phone # (copy/paste from chart):    [facility] - M[address] Burgess  [id number] [facility] [address]  Phone: [phone] Fax: [phone]  The doctors have asked that we provide their patients with the following 2 reminders -- prescription refills can take up to 72 hours, and a friendly reminder that in the future you can use your [facility] account to request refills: Yes

## 2022-04-05 NOTE — TELEPHONE ENCOUNTER
I refilled the requested medication x 1 month.  The patient is due for a visit.  Call the patient on the phone and book the patient with Anthony Andre NP.    PLEASE DOCUMENT THE FACT THAT YOU HAVE CONTACTED THE PATIENT IN THE CHART FOR FUTURE REFERENCE.    Health Maintenance Due   Topic Date Due    COVID-19 Vaccine (1) Never done    Colorectal Cancer Screening  02/11/2020    Shingles Vaccine (2 of 2) 12/21/2020    Influenza Vaccine (1) 09/01/2021    Lipid Panel  10/27/2021

## 2022-05-13 ENCOUNTER — PATIENT MESSAGE (OUTPATIENT)
Dept: SMOKING CESSATION | Facility: CLINIC | Age: 52
End: 2022-05-13
Payer: COMMERCIAL

## 2022-05-26 ENCOUNTER — PATIENT MESSAGE (OUTPATIENT)
Dept: FAMILY MEDICINE | Facility: CLINIC | Age: 52
End: 2022-05-26
Payer: COMMERCIAL

## 2022-07-08 ENCOUNTER — PATIENT MESSAGE (OUTPATIENT)
Dept: FAMILY MEDICINE | Facility: CLINIC | Age: 52
End: 2022-07-08
Payer: COMMERCIAL

## 2022-08-13 DIAGNOSIS — F41.9 ANXIETY: ICD-10-CM

## 2022-08-13 NOTE — TELEPHONE ENCOUNTER
Care Due:                  Date            Visit Type   Department     Provider  --------------------------------------------------------------------------------                                EP -                              PRIMARY      [facility]  Last Visit: 10-      [facility]   MEDICINE       Gomez Ta  Next Visit: None Scheduled  None         None Found                                                            Last  Test          Frequency    Reason                     Performed    Due Date  --------------------------------------------------------------------------------    Office Visit  12 months..  atorvastatin,              10-   10-                             esomeprazole, losartan,                             venlafaxine..............    [employer] Embedded Care Gaps. Reference number: [id number]. 8/13/2022   1:24:36 PM CDT

## 2022-08-15 RX ORDER — VENLAFAXINE HYDROCHLORIDE 150 MG/1
CAPSULE, EXTENDED RELEASE ORAL
Qty: 30 CAPSULE | Refills: 0 | Status: SHIPPED | OUTPATIENT
Start: 2022-08-15 | End: 2022-10-19

## 2022-08-15 NOTE — TELEPHONE ENCOUNTER
The patient is overdue to see me.   Arrange and appointment and schedule labs/immunizations needed in Health Maintenance to close the care gaps.  If appropriate, a virtual visit may be used for this.  I have refilled the requested medicine x 1.    Health Maintenance Due   Topic Date Due    COVID-19 Vaccine (1) Never done    Pneumococcal Vaccines (Age 0-64) (2 - PCV) 09/07/2017    Colorectal Cancer Screening  02/11/2020    Shingles Vaccine (2 of 2) 12/21/2020    Lipid Panel  10/27/2021

## 2022-10-12 ENCOUNTER — PATIENT MESSAGE (OUTPATIENT)
Dept: FAMILY MEDICINE | Facility: CLINIC | Age: 52
End: 2022-10-12
Payer: COMMERCIAL

## 2022-10-19 ENCOUNTER — OFFICE VISIT (OUTPATIENT)
Dept: FAMILY MEDICINE | Facility: CLINIC | Age: 52
End: 2022-10-19
Payer: COMMERCIAL

## 2022-10-19 VITALS
WEIGHT: 189.63 LBS | RESPIRATION RATE: 16 BRPM | SYSTOLIC BLOOD PRESSURE: 127 MMHG | OXYGEN SATURATION: 98 % | HEIGHT: 69 IN | BODY MASS INDEX: 28.08 KG/M2 | TEMPERATURE: 98 F | HEART RATE: 91 BPM | DIASTOLIC BLOOD PRESSURE: 87 MMHG

## 2022-10-19 DIAGNOSIS — K21.00 GASTROESOPHAGEAL REFLUX DISEASE WITH ESOPHAGITIS WITHOUT HEMORRHAGE: ICD-10-CM

## 2022-10-19 DIAGNOSIS — I10 ESSENTIAL HYPERTENSION: ICD-10-CM

## 2022-10-19 DIAGNOSIS — R22.0 FACIAL MASS: ICD-10-CM

## 2022-10-19 DIAGNOSIS — Z00.00 ANNUAL PHYSICAL EXAM: Primary | ICD-10-CM

## 2022-10-19 PROCEDURE — 90472 IMMUNIZATION ADMIN EACH ADD: CPT | Mod: S$GLB,,, | Performed by: FAMILY MEDICINE

## 2022-10-19 PROCEDURE — 90686 FLU VACCINE (QUAD) GREATER THAN OR EQUAL TO 3YO PRESERVATIVE FREE IM: ICD-10-PCS | Mod: S$GLB,,, | Performed by: FAMILY MEDICINE

## 2022-10-19 PROCEDURE — 99396 PR PREVENTIVE VISIT,EST,40-64: ICD-10-PCS | Mod: 25,S$GLB,, | Performed by: FAMILY MEDICINE

## 2022-10-19 PROCEDURE — 90471 FLU VACCINE (QUAD) GREATER THAN OR EQUAL TO 3YO PRESERVATIVE FREE IM: ICD-10-PCS | Mod: S$GLB,,, | Performed by: FAMILY MEDICINE

## 2022-10-19 PROCEDURE — 90677 PNEUMOCOCCAL CONJUGATE VACCINE 20-VALENT: ICD-10-PCS | Mod: S$GLB,,, | Performed by: FAMILY MEDICINE

## 2022-10-19 PROCEDURE — 90750 ZOSTER RECOMBINANT VACCINE: ICD-10-PCS | Mod: S$GLB,,, | Performed by: FAMILY MEDICINE

## 2022-10-19 PROCEDURE — 90472 PNEUMOCOCCAL CONJUGATE VACCINE 20-VALENT: ICD-10-PCS | Mod: S$GLB,,, | Performed by: FAMILY MEDICINE

## 2022-10-19 PROCEDURE — 90686 IIV4 VACC NO PRSV 0.5 ML IM: CPT | Mod: S$GLB,,, | Performed by: FAMILY MEDICINE

## 2022-10-19 PROCEDURE — 99999 PR PBB SHADOW E&M-EST. PATIENT-LVL IV: ICD-10-PCS | Mod: PBBFAC,,, | Performed by: FAMILY MEDICINE

## 2022-10-19 PROCEDURE — 90677 PCV20 VACCINE IM: CPT | Mod: S$GLB,,, | Performed by: FAMILY MEDICINE

## 2022-10-19 PROCEDURE — 99396 PREV VISIT EST AGE 40-64: CPT | Mod: 25,S$GLB,, | Performed by: FAMILY MEDICINE

## 2022-10-19 PROCEDURE — 99999 PR PBB SHADOW E&M-EST. PATIENT-LVL IV: CPT | Mod: PBBFAC,,, | Performed by: FAMILY MEDICINE

## 2022-10-19 PROCEDURE — 90750 HZV VACC RECOMBINANT IM: CPT | Mod: S$GLB,,, | Performed by: FAMILY MEDICINE

## 2022-10-19 PROCEDURE — 90471 IMMUNIZATION ADMIN: CPT | Mod: S$GLB,,, | Performed by: FAMILY MEDICINE

## 2022-10-19 RX ORDER — LOSARTAN POTASSIUM 100 MG/1
100 TABLET ORAL DAILY
Qty: 90 TABLET | Refills: 3 | Status: SHIPPED | OUTPATIENT
Start: 2022-10-19

## 2022-10-19 RX ORDER — ESOMEPRAZOLE MAGNESIUM 40 MG/1
CAPSULE, DELAYED RELEASE ORAL
Qty: 90 CAPSULE | Refills: 3 | Status: SHIPPED | OUTPATIENT
Start: 2022-10-19 | End: 2023-08-10

## 2022-10-19 NOTE — PROGRESS NOTES
Subjective:      Patient ID: Nj Ovalle Jr. is a 51 y.o. male.    Chief Complaint: Annual Exam    Problem List Items Addressed This Visit       Essential hypertension    Overview     The patient presents with essential hypertension.  The patient is tolerating the medication well and is in excellent compliance.  The patient is experiencing no side effects.  Counseling was offered regarding low salt diets.  The patient has a reduced salt intake.  The patient denies chest pain, palpitations, shortness of breath, dyspnea on exertion, left or murmur neck pain, nausea, vomiting, diaphoresis, paroxysmal nocturnal dyspnea, and orthopnea.   Hypertension Medications               losartan (COZAAR) 100 MG tablet TAKE ONE TABLET BY MOUTH DAILY                Relevant Medications    losartan (COZAAR) 100 MG tablet    Gastroesophageal reflux disease with esophagitis    Overview     The patient presents with GERD.  Denies chest pain, nausea & vomiting, belching, cramping, distention, dyspepsia, dysphagia, hematochezia, melena, abdominal pain and weight loss.  Current treatment has included medications that are listed in medications list with significant response.  There has been no medicine intolerance.  The patient cannot identify any exacerbating factors.  Avoidance of NSAID's, ASA, carbonated beverages and spicy food was discussed.           Relevant Medications    esomeprazole (NEXIUM) 40 MG capsule     Other Visit Diagnoses       Annual physical exam    -  Primary    Relevant Orders    Comprehensive Metabolic Panel    Lipid Panel    PSA, Screening    Facial mass        Relevant Orders    Ambulatory referral/consult to ENT          He has had a facial mass for the last year and a half that is now growing a little.  It does not hurt him.  No trauma.  No redness.  It is soft.  The patient's Health Maintenance was reviewed and the following appears to be due:   Health Maintenance Due   Topic Date Due    COVID-19 Vaccine  (1) Never done    Pneumococcal Vaccines (Age 0-64) (2 - PCV) 09/07/2017    Colorectal Cancer Screening  02/11/2020    Shingles Vaccine (2 of 2) 12/21/2020    Lipid Panel  10/27/2021    Influenza Vaccine (1) 09/01/2022       Past Medical History:  Past Medical History:   Diagnosis Date    Anxiety     Essential hypertension 9/7/2016    GERD (gastroesophageal reflux disease)     HTN (hypertension)     LALA (obstructive sleep apnea) 5/31/2017     Past Surgical History:   Procedure Laterality Date    VASECTOMY       Review of patient's allergies indicates:   Allergen Reactions    Bupropion hcl Other (See Comments)    Sertraline Nausea And Vomiting and Other (See Comments)    Benadryl [diphenhydramine hcl] Anxiety     Causes to shake     Current Outpatient Medications on File Prior to Visit   Medication Sig Dispense Refill    [DISCONTINUED] esomeprazole (NEXIUM) 40 MG capsule TAKE ONE CAPSULE BY MOUTH EVERY DAY BEFORE BREAKFAST 30 capsule 0    [DISCONTINUED] losartan (COZAAR) 100 MG tablet TAKE ONE TABLET BY MOUTH EVERY DAY 90 tablet 0    [DISCONTINUED] atorvastatin (LIPITOR) 40 MG tablet Take 1 tablet (40 mg total) by mouth once daily. (Patient not taking: Reported on 10/19/2022) 90 tablet 3    [DISCONTINUED] fluticasone propionate (FLONASE) 50 mcg/actuation nasal spray 1 spray (50 mcg total) by Each Nostril route once daily. (Patient not taking: Reported on 10/19/2022) 18 g 0    [DISCONTINUED] sodium,potassium,mag sulfates (SUPREP BOWEL PREP KIT) 17.5-3.13-1.6 gram SolR Take as instructed on prep sheet (Patient not taking: Reported on 10/19/2022) 354 mL 0    [DISCONTINUED] venlafaxine (EFFEXOR-XR) 150 MG Cp24 TAKE ONE CAPSULE BY MOUTH DAILY (Patient not taking: Reported on 10/19/2022) 30 capsule 0     No current facility-administered medications on file prior to visit.     Social History     Socioeconomic History    Marital status:    Tobacco Use    Smoking status: Every Day     Packs/day: 0.25     Types:  "Cigarettes    Smokeless tobacco: Current     Types: Snuff   Substance and Sexual Activity    Alcohol use: Yes     Alcohol/week: 5.8 standard drinks     Types: 7 Standard drinks or equivalent per week    Drug use: No    Sexual activity: Yes     Partners: Female     Family History   Problem Relation Age of Onset    Hypertension Mother     Cancer Paternal Uncle         thyroid cancer    Cancer Paternal Grandmother        Review of Systems   Constitutional: Negative.  Negative for chills, diaphoresis and fever.   HENT:  Negative for congestion, hearing loss, mouth sores, postnasal drip and sore throat.    Eyes:  Negative for pain and visual disturbance.   Respiratory:  Positive for shortness of breath (he has had this for a while and he is going to see a cardiologist soon for this but I asked him to check a CXR now.). Negative for cough, chest tightness and wheezing.    Cardiovascular:  Negative for chest pain.   Gastrointestinal:  Negative for abdominal pain, anal bleeding, blood in stool, constipation, diarrhea, nausea and vomiting.   Genitourinary:  Negative for dysuria and hematuria.   Musculoskeletal:  Negative for back pain, neck pain and neck stiffness.   Skin:  Negative for rash.   Neurological:  Negative for dizziness and weakness.     Objective:   /87 (BP Location: Left arm, Patient Position: Sitting, BP Method: Large (Manual))   Pulse 91   Temp 98.4 °F (36.9 °C)   Resp 16   Ht 5' 9" (1.753 m)   Wt 86 kg (189 lb 9.5 oz)   SpO2 98%   BMI 28.00 kg/m²     Physical Exam  Vitals reviewed.   Constitutional:       General: He is not in acute distress.     Appearance: Normal appearance. He is well-developed. He is not ill-appearing or diaphoretic.   HENT:      Head: Normocephalic and atraumatic.        Comments: He has a soft moveable 2.5 cm mass noted that feels lipomatous.     Right Ear: Tympanic membrane, ear canal and external ear normal.      Left Ear: Tympanic membrane, ear canal and external ear " normal.      Nose: Nose normal.      Mouth/Throat:      Pharynx: No oropharyngeal exudate.   Eyes:      General: No scleral icterus.        Right eye: No discharge.         Left eye: No discharge.      Conjunctiva/sclera: Conjunctivae normal.      Pupils: Pupils are equal, round, and reactive to light.   Neck:      Thyroid: No thyromegaly.      Vascular: No JVD.   Cardiovascular:      Rate and Rhythm: Normal rate and regular rhythm.      Heart sounds: Normal heart sounds. No murmur heard.    No friction rub. No gallop.   Pulmonary:      Effort: Pulmonary effort is normal. No respiratory distress.      Breath sounds: Normal breath sounds. No wheezing or rales.   Chest:      Chest wall: No tenderness.   Abdominal:      General: Bowel sounds are normal. There is no distension.      Palpations: Abdomen is soft. There is no mass.      Tenderness: There is no abdominal tenderness. There is no guarding or rebound.   Musculoskeletal:         General: No tenderness. Normal range of motion.      Cervical back: Normal range of motion and neck supple.   Lymphadenopathy:      Cervical: No cervical adenopathy.   Skin:     General: Skin is warm and dry.   Neurological:      Mental Status: He is alert and oriented to person, place, and time.      Cranial Nerves: No cranial nerve deficit.      Coordination: Coordination normal.     Assessment:     1. Annual physical exam    2. Facial mass    3. Gastroesophageal reflux disease with esophagitis without hemorrhage    4. Essential hypertension      Plan:   I have changed Tyson Ovalle Jr.'s esomeprazole and losartan.  No problem-specific Assessment & Plan notes found for this encounter.      Follow up in about 1 year (around 10/19/2023) for lab draw of labs ordered today, Book referral appointment(s).    Nj was seen today for annual exam.    Diagnoses and all orders for this visit:    Annual physical exam  -     Comprehensive Metabolic Panel; Future  -     Lipid Panel; Future  -      PSA, Screening; Future    Facial mass  -     Ambulatory referral/consult to ENT; Future    Gastroesophageal reflux disease with esophagitis without hemorrhage  -     esomeprazole (NEXIUM) 40 MG capsule; TAKE ONE CAPSULE BY MOUTH EVERY DAY BEFORE BREAKFAST  Strength: 40 mg    Essential hypertension  -     losartan (COZAAR) 100 MG tablet; Take 1 tablet (100 mg total) by mouth once daily.    Other orders  -     Influenza - Quadrivalent (PF); Future  -     Pneumococcal Conjugate Vaccine (20 Valent) (IM); Future  -     Zoster Recombinant Vaccine    Medications Ordered This Encounter   Medications    esomeprazole (NEXIUM) 40 MG capsule     Sig: TAKE ONE CAPSULE BY MOUTH EVERY DAY BEFORE BREAKFAST  Strength: 40 mg     Dispense:  90 capsule     Refill:  3    losartan (COZAAR) 100 MG tablet     Sig: Take 1 tablet (100 mg total) by mouth once daily.     Dispense:  90 tablet     Refill:  3     .     The patient was instructed to stop the following meds:  Medications Discontinued During This Encounter   Medication Reason    atorvastatin (LIPITOR) 40 MG tablet Patient no longer taking    fluticasone propionate (FLONASE) 50 mcg/actuation nasal spray Patient no longer taking    venlafaxine (EFFEXOR-XR) 150 MG Cp24 Patient no longer taking    sodium,potassium,mag sulfates (SUPREP BOWEL PREP KIT) 17.5-3.13-1.6 gram SolR Patient no longer taking    losartan (COZAAR) 100 MG tablet Reorder    esomeprazole (NEXIUM) 40 MG capsule Reorder     Orders Placed This Encounter   Procedures    Influenza - Quadrivalent (PF)     Standing Status:   Future     Standing Expiration Date:   10/19/2023    Pneumococcal Conjugate Vaccine (20 Valent) (IM)     Standing Status:   Future     Standing Expiration Date:   4/19/2024    Zoster Recombinant Vaccine    Comprehensive Metabolic Panel     Standing Status:   Future     Standing Expiration Date:   10/19/2023    Lipid Panel     Standing Status:   Future     Standing Expiration Date:   10/19/2023     PSA, Screening     Standing Status:   Future     Standing Expiration Date:   10/20/2023    Ambulatory referral/consult to ENT     Standing Status:   Future     Standing Expiration Date:   11/19/2023     Referral Priority:   Routine     Referral Type:   Consultation     Referral Reason:   Specialty Services Required     Referred to Provider:   Catracho Hearn MD     Requested Specialty:   Otolaryngology       Medication List with Changes/Refills   Changed and/or Refilled Medications    Modified Medication Previous Medication    ESOMEPRAZOLE (NEXIUM) 40 MG CAPSULE esomeprazole (NEXIUM) 40 MG capsule       TAKE ONE CAPSULE BY MOUTH EVERY DAY BEFORE BREAKFAST  Strength: 40 mg    TAKE ONE CAPSULE BY MOUTH EVERY DAY BEFORE BREAKFAST    LOSARTAN (COZAAR) 100 MG TABLET losartan (COZAAR) 100 MG tablet       Take 1 tablet (100 mg total) by mouth once daily.    TAKE ONE TABLET BY MOUTH EVERY DAY   Discontinued Medications    ATORVASTATIN (LIPITOR) 40 MG TABLET    Take 1 tablet (40 mg total) by mouth once daily.    FLUTICASONE PROPIONATE (FLONASE) 50 MCG/ACTUATION NASAL SPRAY    1 spray (50 mcg total) by Each Nostril route once daily.    SODIUM,POTASSIUM,MAG SULFATES (SUPREP BOWEL PREP KIT) 17.5-3.13-1.6 GRAM SOLR    Take as instructed on prep sheet    VENLAFAXINE (EFFEXOR-XR) 150 MG CP24    TAKE ONE CAPSULE BY MOUTH DAILY      Medication List with Changes/Refills   Changed and/or Refilled Medications    Modified Medication Previous Medication    ESOMEPRAZOLE (NEXIUM) 40 MG CAPSULE esomeprazole (NEXIUM) 40 MG capsule       TAKE ONE CAPSULE BY MOUTH EVERY DAY BEFORE BREAKFAST  Strength: 40 mg    TAKE ONE CAPSULE BY MOUTH EVERY DAY BEFORE BREAKFAST       Start Date: 10/19/2022End Date: --    Start Date: 10/11/2022End Date: 10/19/2022    LOSARTAN (COZAAR) 100 MG TABLET losartan (COZAAR) 100 MG tablet       Take 1 tablet (100 mg total) by mouth once daily.    TAKE ONE TABLET BY MOUTH EVERY DAY       Start Date: 10/19/2022End Date: --     Start Date: 10/11/2022End Date: 10/19/2022   Discontinued Medications    ATORVASTATIN (LIPITOR) 40 MG TABLET    Take 1 tablet (40 mg total) by mouth once daily.       Start Date: 11/10/2020End Date: 10/19/2022    FLUTICASONE PROPIONATE (FLONASE) 50 MCG/ACTUATION NASAL SPRAY    1 spray (50 mcg total) by Each Nostril route once daily.       Start Date: 2/8/2021  End Date: 10/19/2022    SODIUM,POTASSIUM,MAG SULFATES (SUPREP BOWEL PREP KIT) 17.5-3.13-1.6 GRAM SOLR    Take as instructed on prep sheet       Start Date: 10/26/2020End Date: 10/19/2022    VENLAFAXINE (EFFEXOR-XR) 150 MG CP24    TAKE ONE CAPSULE BY MOUTH DAILY       Start Date: 8/15/2022 End Date: 10/19/2022        He will call dr. Leonard to get a cscope arranged for screening.

## 2022-10-24 ENCOUNTER — LAB VISIT (OUTPATIENT)
Dept: LAB | Facility: HOSPITAL | Age: 52
End: 2022-10-24
Attending: FAMILY MEDICINE
Payer: COMMERCIAL

## 2022-10-24 DIAGNOSIS — Z00.00 ANNUAL PHYSICAL EXAM: ICD-10-CM

## 2022-10-24 LAB
ALBUMIN SERPL BCP-MCNC: 4.3 G/DL (ref 3.5–5.2)
ALP SERPL-CCNC: 61 U/L (ref 55–135)
ALT SERPL W/O P-5'-P-CCNC: 21 U/L (ref 10–44)
ANION GAP SERPL CALC-SCNC: 8 MMOL/L (ref 8–16)
AST SERPL-CCNC: 21 U/L (ref 10–40)
BILIRUB SERPL-MCNC: 0.8 MG/DL (ref 0.1–1)
BUN SERPL-MCNC: 11 MG/DL (ref 6–20)
CALCIUM SERPL-MCNC: 9.9 MG/DL (ref 8.7–10.5)
CHLORIDE SERPL-SCNC: 102 MMOL/L (ref 95–110)
CHOLEST SERPL-MCNC: 241 MG/DL (ref 120–199)
CHOLEST/HDLC SERPL: 4.6 {RATIO} (ref 2–5)
CO2 SERPL-SCNC: 26 MMOL/L (ref 23–29)
COMPLEXED PSA SERPL-MCNC: 1.3 NG/ML (ref 0–4)
CREAT SERPL-MCNC: 1.1 MG/DL (ref 0.5–1.4)
EST. GFR  (NO RACE VARIABLE): >60 ML/MIN/1.73 M^2
GLUCOSE SERPL-MCNC: 91 MG/DL (ref 70–110)
HDLC SERPL-MCNC: 52 MG/DL (ref 40–75)
HDLC SERPL: 21.6 % (ref 20–50)
LDLC SERPL CALC-MCNC: 164.6 MG/DL (ref 63–159)
NONHDLC SERPL-MCNC: 189 MG/DL
POTASSIUM SERPL-SCNC: 4.1 MMOL/L (ref 3.5–5.1)
PROT SERPL-MCNC: 7.5 G/DL (ref 6–8.4)
SODIUM SERPL-SCNC: 136 MMOL/L (ref 136–145)
TRIGL SERPL-MCNC: 122 MG/DL (ref 30–150)

## 2022-10-24 PROCEDURE — 80053 COMPREHEN METABOLIC PANEL: CPT | Performed by: FAMILY MEDICINE

## 2022-10-24 PROCEDURE — 80061 LIPID PANEL: CPT | Performed by: FAMILY MEDICINE

## 2022-10-24 PROCEDURE — 36415 COLL VENOUS BLD VENIPUNCTURE: CPT | Mod: PO | Performed by: FAMILY MEDICINE

## 2022-10-24 PROCEDURE — 84153 ASSAY OF PSA TOTAL: CPT | Performed by: FAMILY MEDICINE

## 2022-10-26 NOTE — PROGRESS NOTES
"I have reviewed the labs and am recommending the following:  CMP/BMP NORMAL-The electrolytes all appear stable at this time.  This includes kidney functions along with routine electrolytes like sugar, potassium and sodium.  If it was a CMP test, the liver enzymes were noted to be stable also.  PSA NORMAL-The PSA screening for prostate cancer is normal.  Recheck annually.    Your calculated risk of having a heart attack in the next 10 years is listed as a percentage in the "ASCVD score" below.  Lowering the cholesterol can help keep this number as low as possible.  Please work hard on your diet and exercise and try to keep your weight and blood pressure as close to normal as possible to help keep this score low.  We don't usually add medicine unless if the count is 10% or greater.   Use diet, exercise and keeping your weight at a normal body mass index to keep your cholesterol as low as possible.   The parameters that this score was calculated on are below:  The 10-year ASCVD risk score (Cornelio OQUENDO, et al., 2019) is: 9.3%    Values used to calculate the score:      Age: 51 years      Sex: Male      Is Non- : No      Diabetic: No      Tobacco smoker: Yes      Systolic Blood Pressure: 105 mmHg      Is BP treated: Yes      HDL Cholesterol: 52 mg/dL      Total Cholesterol: 241 mg/dL      Health maintenance items that remain on your list that need to be arranged are listed below.   Please notify me if you are prepared to get them completed.    COVID-19 Vaccine(1) Never done  Colorectal Cancer Screening due on 02/11/2020    Dr. Gomez Ta  "

## 2022-11-16 ENCOUNTER — PATIENT MESSAGE (OUTPATIENT)
Dept: OTOLARYNGOLOGY | Facility: CLINIC | Age: 52
End: 2022-11-16
Payer: COMMERCIAL

## 2022-12-09 DIAGNOSIS — Z23 IMMUNIZATION DUE: Primary | ICD-10-CM

## 2022-12-23 ENCOUNTER — PATIENT MESSAGE (OUTPATIENT)
Dept: FAMILY MEDICINE | Facility: CLINIC | Age: 52
End: 2022-12-23
Payer: COMMERCIAL

## 2023-01-05 ENCOUNTER — LAB VISIT (OUTPATIENT)
Dept: LAB | Facility: HOSPITAL | Age: 53
End: 2023-01-05
Attending: SPECIALIST
Payer: COMMERCIAL

## 2023-01-05 DIAGNOSIS — R93.89 ABNORMAL RADIOLOGICAL FINDINGS IN SKIN AND SUBCUTANEOUS TISSUE: Primary | ICD-10-CM

## 2023-01-05 DIAGNOSIS — I25.84 CORONARY ATHEROSCLEROSIS DUE TO SEVERELY CALCIFIED CORONARY LESION: ICD-10-CM

## 2023-01-05 DIAGNOSIS — E78.2 MIXED HYPERLIPIDEMIA: ICD-10-CM

## 2023-01-05 LAB
ANION GAP SERPL CALC-SCNC: 13 MMOL/L (ref 8–16)
BASOPHILS # BLD AUTO: 0.06 K/UL (ref 0–0.2)
BASOPHILS NFR BLD: 1 % (ref 0–1.9)
BUN SERPL-MCNC: 12 MG/DL (ref 6–20)
CALCIUM SERPL-MCNC: 10.2 MG/DL (ref 8.7–10.5)
CHLORIDE SERPL-SCNC: 103 MMOL/L (ref 95–110)
CO2 SERPL-SCNC: 26 MMOL/L (ref 23–29)
CREAT SERPL-MCNC: 1.1 MG/DL (ref 0.5–1.4)
DIFFERENTIAL METHOD: ABNORMAL
EOSINOPHIL # BLD AUTO: 0.4 K/UL (ref 0–0.5)
EOSINOPHIL NFR BLD: 6.5 % (ref 0–8)
ERYTHROCYTE [DISTWIDTH] IN BLOOD BY AUTOMATED COUNT: 13.4 % (ref 11.5–14.5)
EST. GFR  (NO RACE VARIABLE): >60 ML/MIN/1.73 M^2
GLUCOSE SERPL-MCNC: 95 MG/DL (ref 70–110)
HCT VFR BLD AUTO: 42.9 % (ref 40–54)
HGB BLD-MCNC: 13.8 G/DL (ref 14–18)
IMM GRANULOCYTES # BLD AUTO: 0.02 K/UL (ref 0–0.04)
IMM GRANULOCYTES NFR BLD AUTO: 0.3 % (ref 0–0.5)
LYMPHOCYTES # BLD AUTO: 1.7 K/UL (ref 1–4.8)
LYMPHOCYTES NFR BLD: 27 % (ref 18–48)
MCH RBC QN AUTO: 28.3 PG (ref 27–31)
MCHC RBC AUTO-ENTMCNC: 32.2 G/DL (ref 32–36)
MCV RBC AUTO: 88 FL (ref 82–98)
MONOCYTES # BLD AUTO: 0.6 K/UL (ref 0.3–1)
MONOCYTES NFR BLD: 9.8 % (ref 4–15)
NEUTROPHILS # BLD AUTO: 3.4 K/UL (ref 1.8–7.7)
NEUTROPHILS NFR BLD: 55.4 % (ref 38–73)
NRBC BLD-RTO: 0 /100 WBC
PLATELET # BLD AUTO: 221 K/UL (ref 150–450)
PMV BLD AUTO: 11.5 FL (ref 9.2–12.9)
POTASSIUM SERPL-SCNC: 4.5 MMOL/L (ref 3.5–5.1)
RBC # BLD AUTO: 4.87 M/UL (ref 4.6–6.2)
SODIUM SERPL-SCNC: 142 MMOL/L (ref 136–145)
WBC # BLD AUTO: 6.15 K/UL (ref 3.9–12.7)

## 2023-01-05 PROCEDURE — 85025 COMPLETE CBC W/AUTO DIFF WBC: CPT | Performed by: SPECIALIST

## 2023-01-05 PROCEDURE — 80048 BASIC METABOLIC PNL TOTAL CA: CPT | Performed by: SPECIALIST

## 2023-01-05 PROCEDURE — 36415 COLL VENOUS BLD VENIPUNCTURE: CPT | Mod: PO | Performed by: SPECIALIST

## 2023-04-19 ENCOUNTER — TELEPHONE (OUTPATIENT)
Dept: FAMILY MEDICINE | Facility: CLINIC | Age: 53
End: 2023-04-19

## 2023-04-19 ENCOUNTER — TELEPHONE (OUTPATIENT)
Dept: FAMILY MEDICINE | Facility: CLINIC | Age: 53
End: 2023-04-19
Payer: COMMERCIAL

## 2023-04-19 RX ORDER — ATORVASTATIN CALCIUM 40 MG/1
40 TABLET, FILM COATED ORAL DAILY
Qty: 90 TABLET | Refills: 3 | Status: SHIPPED | OUTPATIENT
Start: 2023-04-19

## 2023-04-19 NOTE — TELEPHONE ENCOUNTER
Patients wife states that Dr. Brink at [facility] started him back on this medication after his angiogram and is asking for you to continue filling.

## 2023-04-19 NOTE — TELEPHONE ENCOUNTER
----- Message from Hussain Patricio sent at 4/19/2023 12:38 PM CDT -----  Regarding: refill  Contact: patient  Patient called need refill approved,  [facility] sent over request and waiting on it to be signed to refill Lipitor...please call patient at [phone]      Thanks  EL

## 2023-04-19 NOTE — TELEPHONE ENCOUNTER
----- Message from Hussain Patricio sent at 4/19/2023 12:38 PM CDT -----  Regarding: refill  Contact: patient  Patient called need refill approved,  [facility] sent over request and waiting on it to be signed to refill Lipitor...please call patient at [phone]      Thanks  EL

## 2023-04-19 NOTE — TELEPHONE ENCOUNTER
Lab Results   Component Value Date    CHOL 241 (H) 10/24/2022    CHOL 267 (H) 10/27/2020    CHOL 281 (H) 02/12/2020     Lab Results   Component Value Date    HDL 52 10/24/2022    HDL 62 10/27/2020    HDL 67 02/12/2020     Lab Results   Component Value Date    LDLCALC 164.6 (H) 10/24/2022    LDLCALC 172.4 (H) 10/27/2020    LDLCALC 176.6 (H) 02/12/2020     No results found for: DLDL  Lab Results   Component Value Date    TRIG 122 10/24/2022    TRIG 163 (H) 10/27/2020    TRIG 187 (H) 02/12/2020       f1   Lab Results   Component Value Date    CHOLHDL 21.6 10/24/2022    CHOLHDL 23.2 10/27/2020    CHOLHDL 23.8 02/12/2020     Should get a lipid in 3 months.

## 2023-04-19 NOTE — TELEPHONE ENCOUNTER
----- Message from Shana Marte sent at 4/19/2023  3:15 PM CDT -----  Contact: [phone]  Type:  Patient Returning Call    Who Called: Nj  Who Left Message for Patient: Nurse  Does the patient know what this is regarding?: Results  Would the patient rather a call back or a response via [email or web address]? Call  Best Call Back Number: [phone]  Additional Information:

## 2023-04-19 NOTE — TELEPHONE ENCOUNTER
I do not see where lipitor has been requested.   Also, I don't see where this has been prescribed since 2020.    The last lipid in December showed the ASCVD score was not at the level that meds were needed-although close.    Why the request for lipitor at this time?

## 2023-05-09 ENCOUNTER — OFFICE VISIT (OUTPATIENT)
Dept: FAMILY MEDICINE | Facility: CLINIC | Age: 53
End: 2023-05-09
Payer: COMMERCIAL

## 2023-05-09 ENCOUNTER — HOSPITAL ENCOUNTER (OUTPATIENT)
Dept: RADIOLOGY | Facility: HOSPITAL | Age: 53
Discharge: HOME | End: 2023-05-09
Attending: NURSE PRACTITIONER
Payer: COMMERCIAL

## 2023-05-09 VITALS
WEIGHT: 184 LBS | RESPIRATION RATE: 16 BRPM | TEMPERATURE: 98 F | DIASTOLIC BLOOD PRESSURE: 73 MMHG | HEART RATE: 84 BPM | BODY MASS INDEX: 27.25 KG/M2 | HEIGHT: 69 IN | SYSTOLIC BLOOD PRESSURE: 111 MMHG

## 2023-05-09 DIAGNOSIS — Z82.61 FAMILY HISTORY OF RHEUMATOID ARTHRITIS: ICD-10-CM

## 2023-05-09 DIAGNOSIS — M25.511 RIGHT SHOULDER PAIN, UNSPECIFIED CHRONICITY: Primary | ICD-10-CM

## 2023-05-09 DIAGNOSIS — M25.552 LEFT HIP PAIN: ICD-10-CM

## 2023-05-09 DIAGNOSIS — M25.50 ARTHRALGIA, UNSPECIFIED JOINT: ICD-10-CM

## 2023-05-09 DIAGNOSIS — E78.5 HYPERLIPIDEMIA, UNSPECIFIED HYPERLIPIDEMIA TYPE: ICD-10-CM

## 2023-05-09 DIAGNOSIS — M25.511 RIGHT SHOULDER PAIN, UNSPECIFIED CHRONICITY: ICD-10-CM

## 2023-05-09 PROCEDURE — 3074F PR MOST RECENT SYSTOLIC BLOOD PRESSURE < 130 MM HG: ICD-10-PCS | Mod: CPTII,S$GLB,, | Performed by: NURSE PRACTITIONER

## 2023-05-09 PROCEDURE — 73030 X-RAY EXAM OF SHOULDER: CPT | Mod: 26,RT,, | Performed by: RADIOLOGY

## 2023-05-09 PROCEDURE — 99214 OFFICE O/P EST MOD 30 MIN: CPT | Mod: S$GLB,,, | Performed by: NURSE PRACTITIONER

## 2023-05-09 PROCEDURE — 73502 X-RAY EXAM HIP UNI 2-3 VIEWS: CPT | Mod: 26,LT,, | Performed by: RADIOLOGY

## 2023-05-09 PROCEDURE — 3008F BODY MASS INDEX DOCD: CPT | Mod: CPTII,S$GLB,, | Performed by: NURSE PRACTITIONER

## 2023-05-09 PROCEDURE — 1159F MED LIST DOCD IN RCRD: CPT | Mod: CPTII,S$GLB,, | Performed by: NURSE PRACTITIONER

## 2023-05-09 PROCEDURE — 73030 X-RAY EXAM OF SHOULDER: CPT | Mod: TC,PO,RT

## 2023-05-09 PROCEDURE — 3078F PR MOST RECENT DIASTOLIC BLOOD PRESSURE < 80 MM HG: ICD-10-PCS | Mod: CPTII,S$GLB,, | Performed by: NURSE PRACTITIONER

## 2023-05-09 PROCEDURE — 99214 PR OFFICE/OUTPT VISIT, EST, LEVL IV, 30-39 MIN: ICD-10-PCS | Mod: S$GLB,,, | Performed by: NURSE PRACTITIONER

## 2023-05-09 PROCEDURE — 4010F ACE/ARB THERAPY RXD/TAKEN: CPT | Mod: CPTII,S$GLB,, | Performed by: NURSE PRACTITIONER

## 2023-05-09 PROCEDURE — 1160F RVW MEDS BY RX/DR IN RCRD: CPT | Mod: CPTII,S$GLB,, | Performed by: NURSE PRACTITIONER

## 2023-05-09 PROCEDURE — 99999 PR PBB SHADOW E&M-EST. PATIENT-LVL V: CPT | Mod: PBBFAC,,, | Performed by: NURSE PRACTITIONER

## 2023-05-09 PROCEDURE — 73502 XR HIP WITH PELVIS WHEN PERFORMED, 2 OR 3 VIEWS LEFT: ICD-10-PCS | Mod: 26,LT,, | Performed by: RADIOLOGY

## 2023-05-09 PROCEDURE — 99999 PR PBB SHADOW E&M-EST. PATIENT-LVL V: ICD-10-PCS | Mod: PBBFAC,,, | Performed by: NURSE PRACTITIONER

## 2023-05-09 PROCEDURE — 3074F SYST BP LT 130 MM HG: CPT | Mod: CPTII,S$GLB,, | Performed by: NURSE PRACTITIONER

## 2023-05-09 PROCEDURE — 3008F PR BODY MASS INDEX (BMI) DOCUMENTED: ICD-10-PCS | Mod: CPTII,S$GLB,, | Performed by: NURSE PRACTITIONER

## 2023-05-09 PROCEDURE — 73502 X-RAY EXAM HIP UNI 2-3 VIEWS: CPT | Mod: TC,PO,LT

## 2023-05-09 PROCEDURE — 1159F PR MEDICATION LIST DOCUMENTED IN MEDICAL RECORD: ICD-10-PCS | Mod: CPTII,S$GLB,, | Performed by: NURSE PRACTITIONER

## 2023-05-09 PROCEDURE — 1160F PR REVIEW ALL MEDS BY PRESCRIBER/CLIN PHARMACIST DOCUMENTED: ICD-10-PCS | Mod: CPTII,S$GLB,, | Performed by: NURSE PRACTITIONER

## 2023-05-09 PROCEDURE — 3078F DIAST BP <80 MM HG: CPT | Mod: CPTII,S$GLB,, | Performed by: NURSE PRACTITIONER

## 2023-05-09 PROCEDURE — 4010F PR ACE/ARB THEARPY RXD/TAKEN: ICD-10-PCS | Mod: CPTII,S$GLB,, | Performed by: NURSE PRACTITIONER

## 2023-05-09 PROCEDURE — 73030 XR SHOULDER COMPLETE 2 OR MORE VIEWS RIGHT: ICD-10-PCS | Mod: 26,RT,, | Performed by: RADIOLOGY

## 2023-05-09 RX ORDER — DICLOFENAC SODIUM 75 MG/1
75 TABLET, DELAYED RELEASE ORAL 2 TIMES DAILY
Qty: 20 TABLET | Refills: 0 | Status: SHIPPED | OUTPATIENT
Start: 2023-05-09 | End: 2023-05-19

## 2023-05-09 NOTE — PROGRESS NOTES
Subjective     Patient ID: Nj Ovalle Jr. is a 52 y.o. male.    Chief Complaint: Shoulder Pain (Right ) and Hip Pain (left)    Shoulder Pain   The pain is present in the right shoulder. This is a chronic problem. The current episode started more than 1 month ago. There has been no history of extremity trauma. The problem occurs daily. The problem has been unchanged. The quality of the pain is described as aching. The pain is moderate. Associated symptoms include a limited range of motion. Pertinent negatives include no fever, headaches, inability to bear weight, itching, joint locking, joint swelling, numbness, stiffness, tingling or visual symptoms. The symptoms are aggravated by activity. He has tried NSAIDS for the symptoms. The treatment provided mild relief. Family history does not include arthritis. There is no history of diabetes, Injuries to Extremity or migraines.   Hip Pain   The incident occurred more than 1 week ago. There was no injury mechanism. The pain is present in the left hip. The quality of the pain is described as aching. The pain is moderate. The pain has been Intermittent since onset. Pertinent negatives include no inability to bear weight, loss of motion, loss of sensation, muscle weakness, numbness or tingling. He reports no foreign bodies present. The symptoms are aggravated by movement. He has tried NSAIDs for the symptoms. The treatment provided mild (Pt states family history of RA) relief.   Pt also has hyperlipidemia; states has adjusted diet and has been compliant with medication; requests lipid panel. Pt has no other complaints today.  Past Medical History:   Diagnosis Date    Anxiety     Essential hypertension 9/7/2016    GERD (gastroesophageal reflux disease)     HTN (hypertension)     LALA (obstructive sleep apnea) 5/31/2017     Social History     Socioeconomic History    Marital status:    Tobacco Use    Smoking status: Every Day     Packs/day: 0.25     Types:  Cigarettes    Smokeless tobacco: Current     Types: Snuff   Substance and Sexual Activity    Alcohol use: Yes     Alcohol/week: 5.8 standard drinks     Types: 7 Standard drinks or equivalent per week    Drug use: No    Sexual activity: Yes     Partners: Female     Past Surgical History:   Procedure Laterality Date    VASECTOMY         Review of Systems   Constitutional: Negative.  Negative for fever.   HENT: Negative.     Eyes: Negative.    Respiratory: Negative.     Cardiovascular: Negative.    Gastrointestinal: Negative.    Endocrine: Negative.    Genitourinary: Negative.    Musculoskeletal:  Positive for arthralgias (right shoulder, left hip). Negative for stiffness.   Integumentary:  Negative for itching. Negative.   Allergic/Immunologic: Negative.    Neurological: Negative.  Negative for tingling, numbness and headaches.   Psychiatric/Behavioral: Negative.          Objective     Physical Exam  Vitals and nursing note reviewed.   Constitutional:       Appearance: Normal appearance.   HENT:      Head: Normocephalic.      Right Ear: Tympanic membrane, ear canal and external ear normal.      Left Ear: Tympanic membrane, ear canal and external ear normal.      Nose: Nose normal.      Mouth/Throat:      Mouth: Mucous membranes are moist.      Pharynx: Oropharynx is clear.   Eyes:      Conjunctiva/sclera: Conjunctivae normal.      Pupils: Pupils are equal, round, and reactive to light.   Cardiovascular:      Rate and Rhythm: Normal rate and regular rhythm.      Pulses: Normal pulses.      Heart sounds: Normal heart sounds.   Pulmonary:      Effort: Pulmonary effort is normal.      Breath sounds: Normal breath sounds.   Abdominal:      General: Bowel sounds are normal.      Palpations: Abdomen is soft.   Musculoskeletal:      Right shoulder: Bony tenderness present. No swelling, deformity, effusion, laceration, tenderness or crepitus. Normal range of motion. Normal strength. Normal pulse.      Cervical back: Normal  range of motion and neck supple.      Left hip: Bony tenderness present. No deformity, lacerations, tenderness or crepitus. Normal range of motion. Normal strength.   Skin:     General: Skin is warm and dry.      Capillary Refill: Capillary refill takes 2 to 3 seconds.   Neurological:      Mental Status: He is alert and oriented to person, place, and time.   Psychiatric:         Mood and Affect: Mood normal.         Behavior: Behavior normal.         Thought Content: Thought content normal.         Judgment: Judgment normal.          Assessment and Plan     Problem List Items Addressed This Visit       Hyperlipidemia    Relevant Orders    Lipid Panel     Other Visit Diagnoses       Right shoulder pain, unspecified chronicity    -  Primary    Relevant Orders    X-ray Shoulder 2 or More Views Right (Completed)    Left hip pain        Relevant Orders    X-Ray Hip 2 or 3 views Left (with Pelvis when performed) (Completed)    Family history of rheumatoid arthritis        Relevant Orders    RHEUMATOID FACTOR    Arthralgia, unspecified joint        Relevant Orders    RHEUMATOID FACTOR    ROYCE Screen w/Reflex    Uric Acid            Nj was seen today for shoulder pain and hip pain.    Diagnoses and all orders for this visit:    Right shoulder pain, unspecified chronicity  Left hip pain  Arthralgia, unspecified joint  Family history of rheumatoid arthritis  -     X-ray Shoulder 2 or More Views Right; Future        -     X-Ray Hip 2 or 3 views Left (with Pelvis when performed); Future  -     RHEUMATOID FACTOR; Future  -     ROYCE Screen w/Reflex; Future  -     Uric Acid; Future  -     diclofenac (VOLTAREN) 75 MG EC tablet; Take 1 tablet (75 mg total) by mouth 2 (two) times daily. for 10 days  Consider orthopedics  Avoid strenuous activity, lifting  Alternate heat and ice to affected area  Report to ER immediately if symptoms worsen or persist    Hyperlipidemia, unspecified hyperlipidemia type  -     Lipid Panel;  Future

## 2023-05-09 NOTE — PATIENT INSTRUCTIONS
"Consider orthopedics  No NSAIDs while taking voltaren  Avoid strenuous activity, lifting  Alternate heat and ice to affected area  Report to ER immediately if symptoms worsen or persist    Diclofenac (systemic): Patient drug information  Access [employer] for additional drug information, tools, and databases.  Copyright 9906-3929 [employer] All rights reserved.  Contributor Disclosures  (For additional information see "Diclofenac (systemic): Drug information" and see "Diclofenac (systemic): Pediatric drug information")    You must carefully read the "Consumer Information Use and Disclaimer" below in order to understand and correctly use this information.  Brand Names: [place]  Cambia;     Cataflam [DSC];     Lofena;     Zipsor;     Zorvolex    Brand Names: Gianfranco  APO-Diclo;     APO-Diclo Rapide [DSC];     APO-Diclo SR;     Cambia;     Diclofenac-50;     PMS-Diclofenac;     PMS-Diclofenac K [DSC];     PMS-Diclofenac-SR [DSC];     PRO-Diclo Rapide-50 [DSC];     SANDOZ Diclofenac;     SANDOZ Diclofenac Rapide;     SANDOZ Diclofenac SR;     TEVA-Diclofenac EC;     TEVA-Diclofenac SR;     TEVA-Diclofenac-K;     Voltaren;     Voltaren Rapide;     Voltaren SR    Warning  This drug may raise the risk of heart and blood vessel problems like heart attack and stroke. These effects can be deadly. The risk may be greater if you have heart disease or risks for heart disease. However, it can also be raised even if you do not have heart disease or risks for heart disease. The risk can happen within the first weeks of using this drug and may be greater with higher doses or long-term use. Do not use this drug right before or after bypass heart surgery.  This drug may raise the chance of severe and sometimes deadly stomach or bowel problems like ulcers or bleeding. The risk is greater in older people, and in people who have had stomach or bowel ulcers or bleeding before. These problems may occur without warning signs.    What is " this drug used for?  It is used to ease pain.  It is used to treat some types of arthritis.  It is used to ease painful period (menstrual) cycles.  It is used to treat migraine headaches.  It may be given to you for other reasons. Talk with the doctor.    What do I need to tell my doctor BEFORE I take this drug?  If you are allergic to this drug; any part of this drug; or any other drugs, foods, or substances. Tell your doctor about the allergy and what signs you had.  If you have an allergy to aspirin or nonsteroidal anti-inflammatory drugs (NSAIDs) like ibuprofen or naproxen.  If you have ever had asthma caused by a salicylate drug like aspirin or a drug like this one like NSAIDs.  If you have any of these health problems: GI (gastrointestinal) bleeding or kidney problems.  If you have any of these health problems: Heart failure (weak heart) or a recent heart attack.  If you are taking any other NSAID, a salicylate drug like aspirin, or pemetrexed.  If you are having trouble getting pregnant or you are having your fertility checked.  If you are pregnant, plan to become pregnant, or get pregnant while taking this drug. This drug may cause harm to an unborn baby if taken at 20 weeks or later in pregnancy. If you are between 20 to 30 weeks of pregnancy, only take this drug if your doctor has told you to. Do not take this drug if you are more than 30 weeks pregnant.  This is not a list of all drugs or health problems that interact with this drug.  Tell your doctor and pharmacist about all of your drugs (prescription or OTC, natural products, vitamins) and health problems. You must check to make sure that it is safe for you to take this drug with all of your drugs and health problems. Do not start, stop, or change the dose of any drug without checking with your doctor.    What are some things I need to know or do while I take this drug?  Tell all of your health care providers that you take this drug. This includes your  doctors, nurses, pharmacists, and dentists.  Have your blood work checked if you are on this drug for a long time. Talk with your doctor.  High blood pressure has happened with drugs like this one. Have your blood pressure checked as you have been told by your doctor.  Talk with your doctor before you drink alcohol.  If you smoke, talk with your doctor.  If you have asthma, talk with your doctor. You may be more sensitive to this drug.  Do not take more than what your doctor told you to take. Taking more than you are told may raise your chance of very bad side effects.  Do not take this drug for longer than you were told by your doctor.  You may bleed more easily. Be careful and avoid injury. Use a soft toothbrush and an electric razor.  The chance of heart failure is raised with the use of drugs like this one. In people who already have heart failure, the chance of heart attack, having to go to the hospital for heart failure, and death is raised. Talk with the doctor.  The chance of heart attack and heart-related death is raised in people taking drugs like this one after a recent heart attack. People taking drugs like this one after a first heart attack were also more likely to die in the year after the heart attack compared with people not taking drugs like this one. Talk with the doctor.  If you are taking aspirin to help prevent a heart attack, talk with your doctor.  If you have phenylketonuria (PKU), talk with your doctor. Some products have phenylalanine.  Liver problems have happened with drugs like this one. Sometimes, this has been deadly. Call your doctor right away if you have signs of liver problems like dark urine, tiredness, decreased appetite, upset stomach or stomach pain, light-colored stools, throwing up, or yellow skin or eyes.  A severe and sometimes deadly reaction has happened with drugs like this one. Most of the time, this reaction has signs like fever, rash, or swollen glands with problems  in body organs like the liver, kidney, blood, heart, muscles and joints, or lungs. If you have questions, talk with the doctor.  If you are 65 or older, use this drug with care. You could have more side effects.  NSAIDs like this drug may affect egg release (ovulation). This may affect being able to get pregnant. This goes back to normal when this drug is stopped. Talk with the doctor.  Tell your doctor if you are breast-feeding. You will need to talk about any risks to your baby.    What are some side effects that I need to call my doctor about right away?  WARNING/CAUTION: Even though it may be rare, some people may have very bad and sometimes deadly side effects when taking a drug. Tell your doctor or get medical help right away if you have any of the following signs or symptoms that may be related to a very bad side effect:  Signs of an allergic reaction, like rash; hives; itching; red, swollen, blistered, or peeling skin with or without fever; wheezing; tightness in the chest or throat; trouble breathing, swallowing, or talking; unusual hoarseness; or swelling of the mouth, face, lips, tongue, or throat.  Signs of bleeding like throwing up or coughing up blood; vomit that looks like coffee grounds; blood in the urine; black, red, or tarry stools; bleeding from the gums; abnormal vaginal bleeding; bruises without a cause or that get bigger; or bleeding you cannot stop.  Signs of kidney problems like unable to pass urine, change in how much urine is passed, blood in the urine, or a big weight gain.  Signs of high potassium levels like a heartbeat that does not feel normal; feeling confused; feeling weak, lightheaded, or dizzy; feeling like passing out; numbness or tingling; or shortness of breath.  Signs of high blood pressure like very bad headache or dizziness, passing out, or change in eyesight.  Shortness of breath, a big weight gain, or swelling in the arms or legs.  Chest pain or pressure or a fast  heartbeat.  Weakness on 1 side of the body, trouble speaking or thinking, change in balance, drooping on one side of the face, or blurred eyesight.  Feeling very tired or weak.  Pain when passing urine or blood in urine.  Swollen gland.  A severe skin reaction (Magallanes-Eduardo syndrome/toxic epidermal necrolysis) may happen. It can cause severe health problems that may not go away, and sometimes death. Get medical help right away if you have signs like red, swollen, blistered, or peeling skin (with or without fever); red or irritated eyes; or sores in your mouth, throat, nose, or eyes.    What are some other side effects of this drug?  All drugs may cause side effects. However, many people have no side effects or only have minor side effects. Call your doctor or get medical help if any of these side effects or any other side effects bother you or do not go away:  Constipation, diarrhea, stomach pain, upset stomach, or throwing up.  Heartburn.  Gas.  Feeling dizzy or sleepy.  Headache.  Sweating a lot.  Signs of a common cold.  These are not all of the side effects that may occur. If you have questions about side effects, call your doctor. Call your doctor for medical advice about side effects.  You may report side effects to your national health agency.    How is this drug best taken?  Use this drug as ordered by your doctor. Read all information given to you. Follow all instructions closely.  Tablets and capsules:  Some products may not work as well if taken with food. Be sure you know how to take this drug with regard to food. If you are not sure, talk with your doctor or pharmacist.  Take with a full glass of water.  Long-acting products:  Swallow whole. Do not chew, break, or crush.  Packets:  If you take this drug with food, it may not work as well. Talk with your doctor.  Empty contents of packet into 2 to 4 tablespoons (30 to 60 mL) of water, mix well, and drink it right away.    What do I do if I miss a  dose?  Tablets and capsules:  Take a missed dose as soon as you think about it.  If it is close to the time for your next dose, skip the missed dose and go back to your normal time.  Do not take 2 doses at the same time or extra doses.  Packets:  Only 1 dose of this drug is needed. If you miss your dose, take it as soon as you think about it.    How do I store and/or throw out this drug?  Store at room temperature in a dry place. Do not store in a bathroom.  Keep all drugs in a safe place. Keep all drugs out of the reach of children and pets.  Throw away unused or  drugs. Do not flush down a toilet or pour down a drain unless you are told to do so. Check with your pharmacist if you have questions about the best way to throw out drugs. There may be drug take-back programs in your area.    General drug facts  If your symptoms or health problems do not get better or if they become worse, call your doctor.  Do not share your drugs with others and do not take anyone else's drugs.  Some drugs may have another patient information leaflet. If you have any questions about this drug, please talk with your doctor, nurse, pharmacist, or other health care provider.  If you think there has been an overdose, call your poison control center or get medical care right away. Be ready to tell or show what was taken, how much, and when it happened.

## 2023-05-11 ENCOUNTER — TELEPHONE (OUTPATIENT)
Dept: FAMILY MEDICINE | Facility: CLINIC | Age: 53
End: 2023-05-11
Payer: COMMERCIAL

## 2023-05-11 DIAGNOSIS — R76.8 POSITIVE ANA (ANTINUCLEAR ANTIBODY): Primary | ICD-10-CM

## 2023-05-18 NOTE — PROGRESS NOTES
RHEUMATOLOGY OUTPATIENT CLINIC NOTE    05/19/2023    Subjective:       Patient ID: Nj Ovalle Jr. is a 52 y.o. male.    Chief Complaint: Positive ROYCE      HPI       Nj Ovalle Jr. is a 52 y.o. pleasant male here for rheumatology initial evaluation.  Referred for evaluation of positive ROYCE.  Positive a done as workup for shoulder and hip pain.      Right shoulder pain described as a constant ache, worse with occasional movements such as pushing up from a seated position.  Jolting pain with certain movements lasting 5-15 seconds long with radiation down the arm.  Has been taking Voltaren oral every morning with improvement of the aching sensation.    Left hip pain typically laterally, but recently anterior.  Worse with increased activity or getting out of the vehicle.  Occasional muscle aching in his thighs.  No radiation into lower extremities.  Denies muscular weakness.      Occasional hand pain and stiffness worse towards the end of the day after a lot of typing or writing.  Also reports fatigue for the last 6-8 months.  He is now off of anxiety medication Effexor, he has stopped smoking and drinking alcohol.      ROS:  No dryness in mouth or eyes. No oral ulcers.   No photosensitivity, no sunsensitive rashes, no nodules or other skin lesions.  He does have vitiligo.  No triphasic discoloration of digits upon cold exposure.  No pleuritic chest pain, no unexplained fevers, no weight loss, no syncopal or near syncopal episodes, no recurrent infections.  No hematuria or dysuria, no flank pain.   No redness, warmth or swelling to any of her peripheral joints, no muscle weakness.  No lower extremity edema.       Denies dysphagia  Rheumatologic review of systems negative otherwise.     Social History:  Fam hx: mother with RA   Tobacco use: prior   Alcohol use: prior  Occupation: Owns automotive repair shop.   Prior blood clots:  Denies any prior DVT or PE      Prior therapies:  Oral  "voltaren  Ibuprofen  Tylenol     Serologies:  ROYCE 1:160, megan negative   RF negative    Physical Exam:  No obvious synovitis, no erythema, no increased warmth to any joints srinivas UE/LE.    Mild tenderness to palpation right GTB.  Nontender along lumbar paraspinals are bilateral SI.  Pain with abduction right shoulder, pain with internal and external resisted strength.  Strength 5/5 bilateral UE/LE.   Full ROM srinivas UE/LE. 100% fist formation. Negative MTP squeeze.  No soft tissue tenderness.   No malar rash. No active Raynauds. No digital ulcers. No psoriasis.   No oral ulcers. Good salivary production.    Frequent throat clearing      Past Medical History:   Diagnosis Date    Anxiety     Essential hypertension 9/7/2016    GERD (gastroesophageal reflux disease)     HTN (hypertension)     LALA (obstructive sleep apnea) 5/31/2017     Past Surgical History:   Procedure Laterality Date    VASECTOMY       Family History   Problem Relation Age of Onset    Hypertension Mother     Cancer Paternal Uncle         thyroid cancer    Cancer Paternal Grandmother      Social History     Socioeconomic History    Marital status:    Tobacco Use    Smoking status: Every Day     Packs/day: 0.25     Types: Cigarettes    Smokeless tobacco: Current     Types: Snuff   Substance and Sexual Activity    Alcohol use: Yes     Alcohol/week: 5.8 standard drinks     Types: 7 Standard drinks or equivalent per week    Drug use: No    Sexual activity: Yes     Partners: Female     Review of patient's allergies indicates:   Allergen Reactions    Bupropion hcl Other (See Comments)    Sertraline Nausea And Vomiting and Other (See Comments)    Benadryl [diphenhydramine hcl] Anxiety     Causes to shake           Objective:       /81 (BP Location: Left arm, Patient Position: Sitting, BP Method: Large (Automatic))   Pulse 71   Resp 16   Ht 5' 9" (1.753 m)   Wt 83.4 kg (183 lb 13.8 oz)   BMI 27.15 kg/m²       Immunization History   Administered " Date(s) Administered    Influenza - Quadrivalent - PF *Preferred* (6 months and older) 10/26/2020, 10/19/2022    Pneumococcal Conjugate - 20 Valent 10/19/2022    Pneumococcal Polysaccharide - 23 Valent 09/07/2016    Tdap 02/15/2017    Zoster Recombinant 10/26/2020, 10/19/2022            Recent Results (from the past 672 hour(s))   RHEUMATOID FACTOR    Collection Time: 05/09/23  9:05 AM   Result Value Ref Range    Rheumatoid Factor <13.0 0.0 - 15.0 IU/mL   ROYCE Screen w/Reflex    Collection Time: 05/09/23  9:05 AM   Result Value Ref Range    ROYCE Screen Positive (A) Negative <1:80   Uric Acid    Collection Time: 05/09/23  9:05 AM   Result Value Ref Range    Uric Acid 4.5 3.4 - 7.0 mg/dL   Lipid Panel    Collection Time: 05/09/23  9:05 AM   Result Value Ref Range    Cholesterol 140 120 - 199 mg/dL    Triglycerides 77 30 - 150 mg/dL    HDL 46 40 - 75 mg/dL    LDL Cholesterol 78.6 63.0 - 159.0 mg/dL    HDL/Cholesterol Ratio 32.9 20.0 - 50.0 %    Total Cholesterol/HDL Ratio 3.0 2.0 - 5.0    Non-HDL Cholesterol 94 mg/dL   ROYCE Pattern 2    Collection Time: 05/09/23  9:05 AM   Result Value Ref Range    ROYCE Pattern 2 Speckled    ROYCE Profile    Collection Time: 05/09/23  9:05 AM   Result Value Ref Range    Anti Sm Antibody 0.07 0.00 - 0.99 Ratio    Anti-Sm Interpretation Negative Negative    Anti-SSA Antibody 0.06 0.00 - 0.99 Ratio    Anti-SSA Interpretation Negative Negative    Anti-SSB Antibody 0.04 0.00 - 0.99 Ratio    Anti-SSB Interpretation Negative Negative    ds DNA Ab Negative 1:10 Negative 1:10    Anti Sm/RNP Antibody 0.06 0.00 - 0.99 Ratio    Anti-Sm/RNP Interpretation Negative Negative   ROYCE Titer 1    Collection Time: 05/09/23  9:05 AM   Result Value Ref Range    ROYCE Titer 1 1:160    ROYCE Pattern 1    Collection Time: 05/09/23  9:05 AM   Result Value Ref Range    ROYCE PATTERN 1 Homogeneous    ROYCE Titer 2    Collection Time: 05/09/23  9:05 AM   Result Value Ref Range    ROYCE Titer 2 1:160    Urinalysis    Collection  Time: 05/19/23 11:33 AM   Result Value Ref Range    Specimen UA Urine, Clean Catch     Color, UA Yellow Yellow, Straw, [place]    Appearance, UA Clear Clear    pH, UA 6.0 5.0 - 8.0    Specific Gravity, UA 1.010 1.005 - 1.030    Protein, UA Negative Negative    Glucose, UA Negative Negative    Ketones, UA Negative Negative    Bilirubin (UA) Negative Negative    Occult Blood UA Negative Negative    Nitrite, UA Negative Negative    Urobilinogen, UA Negative <2.0 EU/dL    Leukocytes, UA Negative Negative   Protein/Creatinine Ratio, Urine    Collection Time: 05/19/23 11:33 AM   Result Value Ref Range    Protein, Urine Random <7 0 - 15 mg/dL    Creatinine, Urine 64.3 23.0 - 375.0 mg/dL    Prot/Creat Ratio, Urine Unable to calculate 0.00 - 0.20   CK    Collection Time: 05/19/23 11:35 AM   Result Value Ref Range    CPK 90 20 - 200 U/L   CBC Auto Differential    Collection Time: 05/19/23 11:35 AM   Result Value Ref Range    WBC 5.81 3.90 - 12.70 K/uL    RBC 5.33 4.60 - 6.20 M/uL    Hemoglobin 14.6 14.0 - 18.0 g/dL    Hematocrit 45.8 40.0 - 54.0 %    MCV 86 82 - 98 fL    MCH 27.4 27.0 - 31.0 pg    MCHC 31.9 (L) 32.0 - 36.0 g/dL    RDW 13.8 11.5 - 14.5 %    Platelets 210 150 - 450 K/uL    MPV 11.0 9.2 - 12.9 fL    Immature Granulocytes 0.5 0.0 - 0.5 %    Gran # (ANC) 3.7 1.8 - 7.7 K/uL    Immature Grans (Abs) 0.03 0.00 - 0.04 K/uL    Lymph # 1.3 1.0 - 4.8 K/uL    Mono # 0.5 0.3 - 1.0 K/uL    Eos # 0.3 0.0 - 0.5 K/uL    Baso # 0.06 0.00 - 0.20 K/uL    nRBC 0 0 /100 WBC    Gran % 62.9 38.0 - 73.0 %    Lymph % 22.5 18.0 - 48.0 %    Mono % 8.6 4.0 - 15.0 %    Eosinophil % 4.5 0.0 - 8.0 %    Basophil % 1.0 0.0 - 1.9 %    Differential Method Automated    Comprehensive Metabolic Panel    Collection Time: 05/19/23 11:35 AM   Result Value Ref Range    Sodium 140 136 - 145 mmol/L    Potassium 4.5 3.5 - 5.1 mmol/L    Chloride 104 95 - 110 mmol/L    CO2 25 23 - 29 mmol/L    Glucose 93 70 - 110 mg/dL    BUN 14 6 - 20 mg/dL    Creatinine 1.0  0.5 - 1.4 mg/dL    Calcium 9.9 8.7 - 10.5 mg/dL    Total Protein 7.7 6.0 - 8.4 g/dL    Albumin 4.6 3.5 - 5.2 g/dL    Total Bilirubin 0.5 0.1 - 1.0 mg/dL    Alkaline Phosphatase 76 55 - 135 U/L    AST 25 10 - 40 U/L    ALT 45 (H) 10 - 44 U/L    Anion Gap 11 8 - 16 mmol/L    eGFR >60 >60 mL/min/1.73 m^2   Sedimentation rate    Collection Time: 05/19/23 11:35 AM   Result Value Ref Range    Sed Rate 5 0 - 10 mm/Hr   C-Reactive Protein    Collection Time: 05/19/23 11:35 AM   Result Value Ref Range    CRP 0.4 0.0 - 8.2 mg/L   TSH    Collection Time: 05/19/23 11:35 AM   Result Value Ref Range    TSH 2.466 0.400 - 4.000 uIU/mL        No results found for: TBGOLDPLUS   Lab Results   Component Value Date    HEPCAB Negative 10/27/2020        Right shoulder x-ray 05/09/2023  FINDINGS:  4 views the right shoulder.  Moderate AC joint degenerative change. Glenohumeral and acromioclavicular alignment is normal. No fracture or other acute osseous abnormality is seen.  The joint spaces of the shoulder are well-maintained without evidence of significant arthropathy.  No evidence of rotator cuff or bursal calcium deposition.     Left hip x-ray 05/09/2023  FINDINGS: 3 views left hip.  No fracture, suspicious bone lesion, or other acute osseous abnormality is evident.  Joint alignment is anatomic.  The joint space of the left hip is well maintained.  The sacroiliac joints and pubic symphysis are within normal limits.  Mild constipation.        Assessment:       1. Chronic right shoulder pain    2. Positive ROYCE (antinuclear antibody)    3. Left hip pain    4. Family history of rheumatoid arthritis    5. Myalgia    6. Fatigue, unspecified type          Impression:     Right shoulder pain:  Mild AC arthritis on shoulder x-ray.  Pain with resisted external and internal rotation, abduction of right shoulder.  Suspect possible rotator cuff injury.  Occasional radiation down arm.  Denies neck pain.    Left hip pain:  Mechanical pattern pain,  x-rays reviewed no obvious abnormality in bilateral hips.  Refers pain anterior hip.    Positive ROYCE:  Low titer 1:160, megan negative, family history of rheumatoid arthritis.  Denies other symptoms consistent with connective tissue disease at this time.  Mild fatigue.    Tobacco use:  Current smoker    Plan:              Right shoulder pain:  Referral physical therapy [facility]  Topical Voltaren/diclofenac as needed   Strengthening exercises      Left hip pain:  Referral physical therapy   Topical Voltaren/diclofenac as needed   Strengthening exercises     Positive ROYCE:  Discussed at length autoimmune disease versus wear and tear  Reassurance at this time low suspicion for autoimmune disease  Will evaluate for any underlying autoimmune disease    Tobacco use:  Recommend smoking cessation    Labs and x-ray today     Return to clinic 1-2 months to discuss    Tati Richards PA-C  [facility]  Rheumatology       60 minutes of total time spent on the encounter, which includes face to face time and non-face to face time preparing to see the patient (eg, review of tests), Obtaining and/or reviewing separately obtained history, Documenting clinical information in the electronic or other health record, Independently interpreting results (not separately reported) and communicating results to the patient/family/caregiver, or Care coordination (not separately reported).       Disclaimer: This note was prepared using voice recognition system and is likely to have sound alike errors and is not proof read.  Please call me with any questions

## 2023-05-19 ENCOUNTER — OFFICE VISIT (OUTPATIENT)
Dept: RHEUMATOLOGY | Facility: CLINIC | Age: 53
End: 2023-05-19
Payer: COMMERCIAL

## 2023-05-19 ENCOUNTER — HOSPITAL ENCOUNTER (OUTPATIENT)
Dept: RADIOLOGY | Facility: HOSPITAL | Age: 53
Discharge: HOME | End: 2023-05-19
Payer: COMMERCIAL

## 2023-05-19 ENCOUNTER — PATIENT MESSAGE (OUTPATIENT)
Dept: RHEUMATOLOGY | Facility: CLINIC | Age: 53
End: 2023-05-19

## 2023-05-19 VITALS
SYSTOLIC BLOOD PRESSURE: 126 MMHG | RESPIRATION RATE: 16 BRPM | HEIGHT: 69 IN | BODY MASS INDEX: 27.24 KG/M2 | WEIGHT: 183.88 LBS | HEART RATE: 71 BPM | DIASTOLIC BLOOD PRESSURE: 81 MMHG

## 2023-05-19 DIAGNOSIS — Z82.61 FAMILY HISTORY OF RHEUMATOID ARTHRITIS: ICD-10-CM

## 2023-05-19 DIAGNOSIS — R76.8 POSITIVE ANA (ANTINUCLEAR ANTIBODY): ICD-10-CM

## 2023-05-19 DIAGNOSIS — G89.29 CHRONIC RIGHT SHOULDER PAIN: Primary | ICD-10-CM

## 2023-05-19 DIAGNOSIS — M25.511 CHRONIC RIGHT SHOULDER PAIN: Primary | ICD-10-CM

## 2023-05-19 DIAGNOSIS — R53.83 FATIGUE, UNSPECIFIED TYPE: ICD-10-CM

## 2023-05-19 DIAGNOSIS — M79.10 MYALGIA: ICD-10-CM

## 2023-05-19 DIAGNOSIS — M25.552 LEFT HIP PAIN: ICD-10-CM

## 2023-05-19 PROCEDURE — 4010F PR ACE/ARB THEARPY RXD/TAKEN: ICD-10-PCS | Mod: CPTII,S$GLB,,

## 2023-05-19 PROCEDURE — 1160F PR REVIEW ALL MEDS BY PRESCRIBER/CLIN PHARMACIST DOCUMENTED: ICD-10-PCS | Mod: CPTII,S$GLB,,

## 2023-05-19 PROCEDURE — 99205 PR OFFICE/OUTPT VISIT, NEW, LEVL V, 60-74 MIN: ICD-10-PCS | Mod: S$GLB,,,

## 2023-05-19 PROCEDURE — 4010F ACE/ARB THERAPY RXD/TAKEN: CPT | Mod: CPTII,S$GLB,,

## 2023-05-19 PROCEDURE — 1160F RVW MEDS BY RX/DR IN RCRD: CPT | Mod: CPTII,S$GLB,,

## 2023-05-19 PROCEDURE — 3008F BODY MASS INDEX DOCD: CPT | Mod: CPTII,S$GLB,,

## 2023-05-19 PROCEDURE — 3079F DIAST BP 80-89 MM HG: CPT | Mod: CPTII,S$GLB,,

## 2023-05-19 PROCEDURE — 3074F PR MOST RECENT SYSTOLIC BLOOD PRESSURE < 130 MM HG: ICD-10-PCS | Mod: CPTII,S$GLB,,

## 2023-05-19 PROCEDURE — 1159F PR MEDICATION LIST DOCUMENTED IN MEDICAL RECORD: ICD-10-PCS | Mod: CPTII,S$GLB,,

## 2023-05-19 PROCEDURE — 99999 PR PBB SHADOW E&M-EST. PATIENT-LVL V: CPT | Mod: PBBFAC,,,

## 2023-05-19 PROCEDURE — 3074F SYST BP LT 130 MM HG: CPT | Mod: CPTII,S$GLB,,

## 2023-05-19 PROCEDURE — 99205 OFFICE O/P NEW HI 60 MIN: CPT | Mod: S$GLB,,,

## 2023-05-19 PROCEDURE — 3079F PR MOST RECENT DIASTOLIC BLOOD PRESSURE 80-89 MM HG: ICD-10-PCS | Mod: CPTII,S$GLB,,

## 2023-05-19 PROCEDURE — 73130 X-RAY EXAM OF HAND: CPT | Mod: 26,50,, | Performed by: RADIOLOGY

## 2023-05-19 PROCEDURE — 1159F MED LIST DOCD IN RCRD: CPT | Mod: CPTII,S$GLB,,

## 2023-05-19 PROCEDURE — 73130 X-RAY EXAM OF HAND: CPT | Mod: TC,50

## 2023-05-19 PROCEDURE — 3008F PR BODY MASS INDEX (BMI) DOCUMENTED: ICD-10-PCS | Mod: CPTII,S$GLB,,

## 2023-05-19 PROCEDURE — 73130 XR HAND COMPLETE 3 VIEWS BILATERAL: ICD-10-PCS | Mod: 26,50,, | Performed by: RADIOLOGY

## 2023-05-19 PROCEDURE — 99999 PR PBB SHADOW E&M-EST. PATIENT-LVL V: ICD-10-PCS | Mod: PBBFAC,,,

## 2023-05-19 RX ORDER — AMOXICILLIN 500 MG
2 CAPSULE ORAL DAILY
COMMUNITY

## 2024-02-28 ENCOUNTER — TELEPHONE (OUTPATIENT)
Dept: PULMONOLOGY | Facility: CLINIC | Age: 54
End: 2024-02-28
Payer: COMMERCIAL

## 2024-03-15 ENCOUNTER — OFFICE VISIT (OUTPATIENT)
Dept: FAMILY MEDICINE | Facility: CLINIC | Age: 54
End: 2024-03-15
Payer: COMMERCIAL

## 2024-03-15 VITALS
SYSTOLIC BLOOD PRESSURE: 125 MMHG | TEMPERATURE: 98 F | HEART RATE: 75 BPM | HEIGHT: 69 IN | WEIGHT: 185.13 LBS | BODY MASS INDEX: 27.42 KG/M2 | OXYGEN SATURATION: 98 % | DIASTOLIC BLOOD PRESSURE: 85 MMHG

## 2024-03-15 DIAGNOSIS — K21.9 GASTROESOPHAGEAL REFLUX DISEASE, UNSPECIFIED WHETHER ESOPHAGITIS PRESENT: Primary | ICD-10-CM

## 2024-03-15 DIAGNOSIS — J39.2 THROAT IRRITATION: ICD-10-CM

## 2024-03-15 PROCEDURE — 99213 OFFICE O/P EST LOW 20 MIN: CPT | Mod: S$GLB,,, | Performed by: NURSE PRACTITIONER

## 2024-03-15 PROCEDURE — 1160F RVW MEDS BY RX/DR IN RCRD: CPT | Mod: CPTII,S$GLB,, | Performed by: NURSE PRACTITIONER

## 2024-03-15 PROCEDURE — 3079F DIAST BP 80-89 MM HG: CPT | Mod: CPTII,S$GLB,, | Performed by: NURSE PRACTITIONER

## 2024-03-15 PROCEDURE — 99999 PR PBB SHADOW E&M-EST. PATIENT-LVL V: CPT | Mod: PBBFAC,,, | Performed by: NURSE PRACTITIONER

## 2024-03-15 PROCEDURE — 3008F BODY MASS INDEX DOCD: CPT | Mod: CPTII,S$GLB,, | Performed by: NURSE PRACTITIONER

## 2024-03-15 PROCEDURE — 3074F SYST BP LT 130 MM HG: CPT | Mod: CPTII,S$GLB,, | Performed by: NURSE PRACTITIONER

## 2024-03-15 PROCEDURE — 1159F MED LIST DOCD IN RCRD: CPT | Mod: CPTII,S$GLB,, | Performed by: NURSE PRACTITIONER

## 2024-03-15 RX ORDER — PANTOPRAZOLE SODIUM 40 MG/1
40 TABLET, DELAYED RELEASE ORAL DAILY
Qty: 30 TABLET | Refills: 11 | Status: SHIPPED | OUTPATIENT
Start: 2024-03-15 | End: 2025-03-15

## 2024-03-15 RX ORDER — SUCRALFATE 1 G/1
1 TABLET ORAL 4 TIMES DAILY
Qty: 40 TABLET | Refills: 0 | Status: SHIPPED | OUTPATIENT
Start: 2024-03-15 | End: 2024-03-25

## 2024-03-15 NOTE — PATIENT INSTRUCTIONS
Avoid GERD inducing foods, beverages  Hydrate well  Rest   Report to ER immediately if symptoms worsen or persist  Kolby Mauro,     If you are due for any health screening(s) below please notify me so we can arrange them to be ordered and scheduled. Most healthy patients at your age complete them, but you are free to accept or refuse.     If you can't do it, I'll definitely understand. If you can, I'd certainly appreciate it!    Tests to Keep You Healthy    Colon Cancer Screening: DUE  Last Blood Pressure <= 139/89 (3/15/2024): Yes      Its time for your colon cancer screening     Colorectal cancer is one of the leading causes of cancer death for men and women but it doesnt have to be. Screenings can prevent colorectal cancer or find it early enough to treat and cure the disease.     Our records indicate that you may be overdue for colon cancer screening. A colonoscopy or stool screening test can help identify patients at risk for developing colon cancer. Cancer screenings save lives, so schedule yours today to stay healthy.     A colonoscopy is the preferred test for detecting colon cancer. It is needed only once every 10 years if results are negative. While you are sedated, a flexible, lighted tube with a tiny camera is inserted into the rectum and advanced through the colon to look for cancers.     An alternative screening test that is used at home and returned to the lab may also be used. It detects hidden blood in bowel movements which could indicate cancer in the colon. If results are positive, you will need a colonoscopy to determine if the blood is a sign of cancer. This type of follow up (diagnostic) colonoscopy usually requires additional copays as required by your insurance provider.     If you recently had your colon cancer screening performed outside of [facility], please let your Health care team know so that they can update your health record. Please contact your PCP if you have any  questions.    Were here to help you quit smoking     Our records indicated that you are still smoking. One of the best things you can do for your health is to stop smoking and we are here to help.     Talk with your provider about our Smoking Cessation Program and how we can support you on your journey.

## 2024-03-15 NOTE — PROGRESS NOTES
Subjective     Patient ID: Nj Ovalle Jr. is a 53 y.o. male.    Chief Complaint: Sore Throat    Gastroesophageal Reflux  He complains of coughing (states constantly having to clear throat), heartburn and a sore throat. He reports no abdominal pain, no belching, no chest pain, no choking, no dysphagia, no early satiety, no globus sensation, no hoarse voice, no nausea, no stridor, no tooth decay, no water brash or no wheezing. This is a chronic problem. The current episode started more than 1 year ago. The problem occurs frequently. The problem has been gradually worsening (Pt states worsening over past 6 m; states when nexium changed to generic GERD started to worsen). The heartburn duration is several minutes. The heartburn is located in the substernum. The heartburn does not limit his activity. The heartburn doesn't change with position. The symptoms are aggravated by certain foods. Pertinent negatives include no anemia, fatigue, melena, muscle weakness, orthopnea or weight loss. There are no known risk factors. He has tried a PPI for the symptoms. The treatment provided mild relief. Past procedures do not include an abdominal ultrasound, an EGD, esophageal manometry, esophageal pH monitoring, H. pylori antibody titer or a UGI. Past invasive treatments do not include gastroplasty, gastroplication or reflux surgery.     Past Medical History:   Diagnosis Date    Anxiety     Essential hypertension 9/7/2016    GERD (gastroesophageal reflux disease)     HTN (hypertension)     LALA (obstructive sleep apnea) 5/31/2017     Social History     Socioeconomic History    Marital status:    Tobacco Use    Smoking status: Every Day     Current packs/day: 0.25     Types: Cigarettes    Smokeless tobacco: Current     Types: Snuff   Substance and Sexual Activity    Alcohol use: Yes     Alcohol/week: 5.8 standard drinks of alcohol     Types: 7 Standard drinks or equivalent per week    Drug use: No    Sexual activity:  Yes     Partners: Female     Past Surgical History:   Procedure Laterality Date    VASECTOMY         Review of Systems   Constitutional: Negative.  Negative for fatigue and weight loss.   HENT:  Positive for sore throat. Negative for hoarse voice.    Eyes: Negative.    Respiratory:  Positive for cough (states constantly having to clear throat). Negative for choking and wheezing.    Cardiovascular: Negative.  Negative for chest pain.   Gastrointestinal:  Positive for heartburn and reflux. Negative for abdominal pain, dysphagia, melena and nausea.   Endocrine: Negative.    Genitourinary: Negative.    Musculoskeletal: Negative.  Negative for muscle weakness.   Integumentary:  Negative.   Allergic/Immunologic: Negative.    Neurological: Negative.    Psychiatric/Behavioral: Negative.            Objective     Physical Exam  Vitals and nursing note reviewed.   Constitutional:       Appearance: Normal appearance.   HENT:      Head: Normocephalic.      Right Ear: Tympanic membrane, ear canal and external ear normal.      Left Ear: Tympanic membrane, ear canal and external ear normal.      Nose: Nose normal.      Mouth/Throat:      Mouth: Mucous membranes are moist.      Pharynx: Oropharynx is clear.   Eyes:      Conjunctiva/sclera: Conjunctivae normal.      Pupils: Pupils are equal, round, and reactive to light.   Cardiovascular:      Rate and Rhythm: Normal rate and regular rhythm.      Pulses: Normal pulses.      Heart sounds: Normal heart sounds.   Pulmonary:      Effort: Pulmonary effort is normal.      Breath sounds: Normal breath sounds.   Abdominal:      General: Bowel sounds are normal.      Palpations: Abdomen is soft.      Tenderness: There is no abdominal tenderness.   Musculoskeletal:         General: Normal range of motion.      Cervical back: Normal range of motion and neck supple.   Skin:     General: Skin is warm and dry.      Capillary Refill: Capillary refill takes 2 to 3 seconds.   Neurological:       Mental Status: He is alert and oriented to person, place, and time.   Psychiatric:         Mood and Affect: Mood normal.         Behavior: Behavior normal.         Thought Content: Thought content normal.         Judgment: Judgment normal.            Assessment and Plan     1. Gastroesophageal reflux disease, unspecified whether esophagitis present  2. Throat irritation  -     H. pylori antigen, stool; Future; Expected date: 03/15/2024         -     Ambulatory referral/consult to ENT; Future; Expected date: 03/22/2024         -     pantoprazole (PROTONIX) 40 MG tablet; Take 1 tablet (40 mg total) by mouth once daily.  Dispense: 30 tablet; Refill: 11  -     sucralfate (CARAFATE) 1 gram tablet; Take 1 tablet (1 g total) by mouth 4 (four) times daily. for 10 days  Dispense: 40 tablet; Refill: 0  Avoid GERD inducing foods, beverages  Hydrate well  Rest   Report to ER immediately if symptoms worsen or persist               No follow-ups on file.

## 2024-04-03 ENCOUNTER — PATIENT MESSAGE (OUTPATIENT)
Dept: PULMONOLOGY | Facility: CLINIC | Age: 54
End: 2024-04-03
Payer: COMMERCIAL

## 2024-09-11 ENCOUNTER — PATIENT MESSAGE (OUTPATIENT)
Dept: FAMILY MEDICINE | Facility: CLINIC | Age: 54
End: 2024-09-11
Payer: COMMERCIAL

## 2025-02-25 ENCOUNTER — PATIENT MESSAGE (OUTPATIENT)
Dept: FAMILY MEDICINE | Facility: CLINIC | Age: 55
End: 2025-02-25
Payer: COMMERCIAL